# Patient Record
Sex: MALE | Race: BLACK OR AFRICAN AMERICAN | Employment: FULL TIME | URBAN - METROPOLITAN AREA
[De-identification: names, ages, dates, MRNs, and addresses within clinical notes are randomized per-mention and may not be internally consistent; named-entity substitution may affect disease eponyms.]

---

## 2020-04-27 ENCOUNTER — APPOINTMENT (OUTPATIENT)
Dept: GENERAL RADIOLOGY | Facility: HOSPITAL | Age: 35
DRG: 637 | End: 2020-04-27
Attending: PHYSICIAN ASSISTANT
Payer: COMMERCIAL

## 2020-04-27 ENCOUNTER — HOSPITAL ENCOUNTER (INPATIENT)
Facility: HOSPITAL | Age: 35
LOS: 3 days | Discharge: HOME | DRG: 637 | End: 2020-04-30
Attending: EMERGENCY MEDICINE | Admitting: HOSPITALIST
Payer: COMMERCIAL

## 2020-04-27 DIAGNOSIS — E87.2 METABOLIC ACIDOSIS DUE TO DIABETES MELLITUS (HCC): ICD-10-CM

## 2020-04-27 DIAGNOSIS — E11.9 DIABETES MELLITUS, NEW ONSET (HCC): Chronic | ICD-10-CM

## 2020-04-27 DIAGNOSIS — E13.10 DIABETIC KETOACIDOSIS WITHOUT COMA ASSOCIATED WITH OTHER SPECIFIED DIABETES MELLITUS (HCC): Primary | ICD-10-CM

## 2020-04-27 DIAGNOSIS — E11.69 METABOLIC ACIDOSIS DUE TO DIABETES MELLITUS (HCC): ICD-10-CM

## 2020-04-27 DIAGNOSIS — E86.0 DEHYDRATION: ICD-10-CM

## 2020-04-27 PROBLEM — E11.10 METABOLIC ACIDOSIS DUE TO DIABETES MELLITUS (HCC): Status: ACTIVE | Noted: 2020-04-27

## 2020-04-27 PROBLEM — E11.10 DIABETIC ACIDOSIS WITHOUT COMA (HCC): Status: ACTIVE | Noted: 2020-04-27

## 2020-04-27 PROCEDURE — 71045 X-RAY EXAM CHEST 1 VIEW: CPT | Performed by: PHYSICIAN ASSISTANT

## 2020-04-27 PROCEDURE — 99291 CRITICAL CARE FIRST HOUR: CPT | Performed by: HOSPITALIST

## 2020-04-27 RX ORDER — DEXMEDETOMIDINE HYDROCHLORIDE 4 UG/ML
INJECTION, SOLUTION INTRAVENOUS CONTINUOUS
Status: DISCONTINUED | OUTPATIENT
Start: 2020-04-27 | End: 2020-04-29

## 2020-04-27 RX ORDER — INSULIN ASPART 100 [IU]/ML
0.2 INJECTION, SOLUTION INTRAVENOUS; SUBCUTANEOUS ONCE
Status: COMPLETED | OUTPATIENT
Start: 2020-04-27 | End: 2020-04-27

## 2020-04-27 RX ORDER — DEXTROSE AND SODIUM CHLORIDE 5; .45 G/100ML; G/100ML
100 INJECTION, SOLUTION INTRAVENOUS CONTINUOUS PRN
Status: DISCONTINUED | OUTPATIENT
Start: 2020-04-27 | End: 2020-04-29

## 2020-04-27 RX ORDER — DEXTROSE MONOHYDRATE 25 G/50ML
50 INJECTION, SOLUTION INTRAVENOUS
Status: DISCONTINUED | OUTPATIENT
Start: 2020-04-27 | End: 2020-04-29 | Stop reason: SDUPTHER

## 2020-04-27 RX ORDER — SODIUM CHLORIDE 9 MG/ML
INJECTION, SOLUTION INTRAVENOUS CONTINUOUS
Status: DISCONTINUED | OUTPATIENT
Start: 2020-04-27 | End: 2020-04-30

## 2020-04-27 RX ORDER — ACETAMINOPHEN 500 MG
1000 TABLET ORAL EVERY 6 HOURS PRN
Status: DISCONTINUED | OUTPATIENT
Start: 2020-04-27 | End: 2020-04-30

## 2020-04-27 RX ORDER — ONDANSETRON 2 MG/ML
4 INJECTION INTRAMUSCULAR; INTRAVENOUS EVERY 6 HOURS PRN
Status: DISCONTINUED | OUTPATIENT
Start: 2020-04-27 | End: 2020-04-30

## 2020-04-27 NOTE — ED INITIAL ASSESSMENT (HPI)
Treated 10 days ago for strep. Finished Abx. For two days SOB. Denies fever. May have a coworker with [employer]. Brought in by EMS, sats 100. Lung sounds clear.

## 2020-04-27 NOTE — H&P
[facility]  History and Physical     Sabrina Jorge Patient Status:  Emergency    1985 MRN [id number]   Location [address] [facility] Attending Felipe Tyson MD   Hosp Day # 0 PCP None Pcp     Chief Complaint: Weakness    Hi tenderness or deformity. Abdomen: Soft, nontender, nondistended. Positive bowel sounds. No rebound or guarding. Neurologic: CNII-XII grossly intact. No focal neurological deficits. Musculoskeletal: Moves all extremities. Extremities: No edema.  No cya

## 2020-04-27 NOTE — ED PROVIDER NOTES
Patient Seen in: [facility] Emergency Department      History   Patient presents with:  Dyspnea CATHLEEN SOB    Stated Complaint: SOB    HPI    71-year-old anxious male here with complaint of cough in tandem with shortness of breath and dehydrated. Erika Chiang Right Ear: Tympanic membrane and external ear normal.      Left Ear: Tympanic membrane and external ear normal.      Nose: Rhinorrhea present. Rhinorrhea is clear. Mouth/Throat:      Lips: Pink.       Mouth: Mucous membranes are moist.      Pharynx: the following components:    Venous pH 7.01 (*)     Venous pCO2 17 (*)     Venous pO2 59 (*)     Venous O2 Sat.  Calc. 70 (*)     Venous Bicarbonate 4.3 (*)     All other components within normal limits   URINALYSIS WITH CULTURE REFLEX - Abnormal; Notable f heart size and pulmonary vascularity. No focal pulmonary opacity. CONCLUSION:  Shallow inspiration. No evidence of active cardiopulmonary disease.     Dictated by: Ginette Syed MD on 4/27/2020 at 3:39 PM     Finalized by: Ginette Syed MD on 4/27/2020 a

## 2020-04-28 PROCEDURE — 99233 SBSQ HOSP IP/OBS HIGH 50: CPT | Performed by: INTERNAL MEDICINE

## 2020-04-28 RX ORDER — AZITHROMYCIN 250 MG/1
250 TABLET, FILM COATED ORAL DAILY
Status: ON HOLD | COMMUNITY
End: 2020-04-30

## 2020-04-28 RX ORDER — POTASSIUM CHLORIDE 20 MEQ/1
40 TABLET, EXTENDED RELEASE ORAL ONCE
Status: COMPLETED | OUTPATIENT
Start: 2020-04-28 | End: 2020-04-28

## 2020-04-28 RX ORDER — SODIUM CHLORIDE 0.9 % (FLUSH) 0.9 %
10 SYRINGE (ML) INJECTION EVERY 12 HOURS
Status: DISCONTINUED | OUTPATIENT
Start: 2020-04-28 | End: 2020-04-30

## 2020-04-28 NOTE — PHYSICAL THERAPY NOTE
Physical therapy evaluation requested. Attempted to see pt at this time, but will hold until labs are therapeutic for activity.

## 2020-04-28 NOTE — PLAN OF CARE
Admitted at 2030 with DKA( A total of Novolog 54 units was given in ER and Insulin drip was started). Patient is anxious, tachypneac at rest with kussmaul breathing initially. Dr. Amber Sierra and Dr. Lisy Castellanos both were called for initial orders.  Attempted to star

## 2020-04-28 NOTE — PROGRESS NOTES
Pulmonary Progress Note        NAME: Solitario Thomas - ROOM: 6422/7181-Q - MRN: [id number] - Age: 36year old - : 1985        Last 24hrs: No events overnight, remains lethargic this AM but is able to have a conversation, knows where he is and what wa and renal function  4. Obesity - at risk for CARLOS ENRIQUE  -consider outpatient PSG  5. Nutrition - diet per endo  6. Proph - SCD  7.  Dispo - full code; would monitor in ICU for now  [facility] Pulmonary and Critical Care

## 2020-04-28 NOTE — RESPIRATORY THERAPY NOTE
Patient on vapotherm, weaned down to 25L 21%. Sats 100%, RR upper 20s to 30s. Will continue to wean as tolerated.

## 2020-04-28 NOTE — RESPIRATORY THERAPY NOTE
Patient not tolerating Bipap machine. Patient screams and pulls off mask and becomes more tachypneic. Patient started on vapotherm and tolerating well. Dr. Madelyn Washburn paged and notified.  No further orders at this time

## 2020-04-28 NOTE — CONSULTS
[facility] PULMONARY/CRITICAL CARE CONSULTATION       HPI: Arabella Thomson is a 36year old male with a history of morbid obesity who presented to the ER today with c/o generalized fatigue and dyspnea over the past few days.  He also has had increased urinary frequ Years of education: Not on file      Highest education level: Not on file    Occupational History      Not on file    Social Needs      Financial resource strain: Not on file      Food insecurity:        Worry: Not on file        Inability: Not on file Lungs - equal breath sounds bilaterally. No wheezing, crackles, rhonchi  CV - tachycardic, no murmurs  Abdomen - soft, nontender to palpation. No organomegaly appreciated. Obese  Extremities - No cyanosis, clubbing, edema appreciated.       Recent Labs   La

## 2020-04-28 NOTE — VASCULAR ACCESS
Consulted to place a midline. Right arm evaluated, unable to visualize basilic or brachial vessels on ultrasound. Cephalic vessel noted. Midline placed with difficulty threading line.  Additional guidewire utilized and catheter placed with good blood return

## 2020-04-28 NOTE — PROGRESS NOTES
Dr. Marycarmen Pabon ([facility]) called and patient status update was given. Also notified that morning labs were not drawn due to pt is a difficult stick ( x 3 un succesful attempts). To page her back once morning labs were drawn and resulted.

## 2020-04-28 NOTE — PROGRESS NOTES
[facility]  Progress Note    Peyman Marie Patient Status:  Inpatient    1985 MRN [id number]   [facility] 6NE-A Attending Angela Burger MD   Hosp Day # 1 PCP None Pcp     CC: Generalized weakness    SUBJECTIVE:  Generalized weak 21 04/28/2020     04/28/2020    K 4.1 04/28/2020     04/28/2020    CO2 8.0 04/28/2020     04/28/2020    CA 8.6 04/28/2020    ALB 4.3 04/27/2020    ALKPHO 98 04/27/2020    BILT 0.7 04/27/2020    TP 9.6 04/27/2020    AST 20 04/27/2020    A consult  3.   2. Hyponatremia  1. IV fluids  2. Sodium improving  3. Follow BMP in a.m.  3. Acute kidney injury  1. IV fluids  2. Follow BMP in a.m.  4. Reactive leukocytosis, sinus tachycardia  1. Rapid SARS-CoV-2 negative on 4/27/2020  2.  Streptococcal g

## 2020-04-28 NOTE — PAYOR COMM NOTE
--------------  ADMISSION REVIEW     Payor: [address] [facility]  Subscriber #:  [id number]  Authorization Number: [id number]    Admit date: 4/27/20  Admit time: 2021       Patient Seen in: [facility] Emergency Department    History   Patient presents Tachypnea present. Comments: Labored breathing  Skin:     General: Skin is warm. Capillary Refill: Capillary refill takes less than 2 seconds. Neurological:      General: No focal deficit present.       Mental Status: He is alert and oriented to Neutrophil Absolute Prelim 13.92 (*)     Neutrophil Absolute 13.92 (*)     Monocyte Absolute 1.10 (*)      Disposition and Plan     Clinical Impression:  Diabetic ketoacidosis without coma associated with other specified diabetes mellitus ([facility]  (primar rebound or guarding. Neurologic: CNII-XII grossly intact. No focal neurological deficits. Musculoskeletal: Moves all extremities. Extremities: No edema. No cyanosis. Integument: No rashes or lesions. Psychiatric: Appropriate mood and affect.     Lab Normocephalic, without obvious abnormality, atraumatic  Throat: oral mucosa moist  Neck: no adenopathy, no carotid bruit, no JVD  Lungs: clear to auscultation bilaterally, tachypneic  Heart: S1, S2 normal, no murmur,  regular rate and rhythm  Abdomen: soft 4/28/2020 0200 Rate/Dose Verify 100 mL/hr Intravenous     4/28/2020 0112 New Bag 100 mL/hr Intravenous       insulin aspart (NOVOLOG) 100 UNIT/ML vial 27 Units     Date Action Dose Route     4/27/2020 1623 Given 27 Units Subcutaneous (Right Upper Arm) Route     4/28/2020 0200 Given 40 mEq Oral       Potassium Chloride ER (K-DUR M20) CR tab 40 mEq     Date Action Dose Route     4/28/2020 1304 Given 40 mEq Oral       0.9% NaCl infusion     Date Action Dose Route     4/27/2020 2100 New Bag (none) Intraveno

## 2020-04-28 NOTE — CONSULTS
[facility]  Report of Consultation    Agathaevelyne Willis Patient Status:  Inpatient    1985 MRN [id number]   [facility] 6NE-A Attending Devon Tanner MD   Hosp Day # 1 PCP None Pcp     Reason for Consultation:  DKA    History of Pres Regular Human (NOVOLIN R) 100 Units in sodium chloride 0.9% 100 mL infusion, 2-52 Units/hr, Intravenous, Continuous  •  glucose (DEX4) oral liquid 15 g, 15 g, Oral, Q15 Min PRN **OR** Glucose-Vitamin C (DEX-4) chewable tab 4 tablet, 4 tablet, Oral, Q15 Min acidosis without coma (HCC)     Hyponatremia     SHANNAN (acute kidney injury) ([facility]     Metabolic acidosis     Morbid obesity with BMI of 40.0-44.9, adult ([facility]     Diabetic ketoacidosis without coma associated with other specified diabetes mellitus ([facility]

## 2020-04-28 NOTE — PROGRESS NOTES
[facility]  Progress Note     Haig Patches Patient Status:  Inpatient    1985 MRN [id number]   [facility] 6NE-A Attending Stefania Vigil MD   Hosp Day # 1 PCP None Pcp     Chief Complaint: dka    S: Patient feeling better tod mg/dL). No results for input(s): PTP, INR in the last 168 hours. No results for input(s): TROP, CK in the last 168 hours. Imaging: Imaging data reviewed in Epic.     Medications:   • Normal Saline Flush  10 mL Intravenous Q12H       ASSESSMENT

## 2020-04-29 PROBLEM — E11.9 DIABETES MELLITUS, NEW ONSET (HCC): Chronic | Status: ACTIVE | Noted: 2020-04-29

## 2020-04-29 PROCEDURE — 99233 SBSQ HOSP IP/OBS HIGH 50: CPT | Performed by: CLINICAL NURSE SPECIALIST

## 2020-04-29 PROCEDURE — 99232 SBSQ HOSP IP/OBS MODERATE 35: CPT | Performed by: HOSPITALIST

## 2020-04-29 RX ORDER — POTASSIUM CHLORIDE 1.5 G/1.77G
40 POWDER, FOR SOLUTION ORAL EVERY 4 HOURS
Status: DISCONTINUED | OUTPATIENT
Start: 2020-04-29 | End: 2020-04-29

## 2020-04-29 RX ORDER — DEXTROSE MONOHYDRATE 25 G/50ML
50 INJECTION, SOLUTION INTRAVENOUS
Status: DISCONTINUED | OUTPATIENT
Start: 2020-04-29 | End: 2020-04-30

## 2020-04-29 RX ORDER — ENOXAPARIN SODIUM 100 MG/ML
60 INJECTION SUBCUTANEOUS DAILY
Status: DISCONTINUED | OUTPATIENT
Start: 2020-04-30 | End: 2020-04-30

## 2020-04-29 RX ORDER — BLOOD-GLUCOSE METER
EACH MISCELLANEOUS
Qty: 1 KIT | Refills: 0 | Status: SHIPPED | OUTPATIENT
Start: 2020-04-29

## 2020-04-29 RX ORDER — ENOXAPARIN SODIUM 100 MG/ML
40 INJECTION SUBCUTANEOUS DAILY
Status: DISCONTINUED | OUTPATIENT
Start: 2020-04-29 | End: 2020-04-29

## 2020-04-29 RX ORDER — POTASSIUM CHLORIDE 14.9 MG/ML
20 INJECTION INTRAVENOUS ONCE
Status: COMPLETED | OUTPATIENT
Start: 2020-04-30 | End: 2020-04-30

## 2020-04-29 RX ORDER — POTASSIUM CHLORIDE 20 MEQ/1
40 TABLET, EXTENDED RELEASE ORAL EVERY 4 HOURS
Status: COMPLETED | OUTPATIENT
Start: 2020-04-29 | End: 2020-04-29

## 2020-04-29 RX ORDER — BLOOD SUGAR DIAGNOSTIC
STRIP MISCELLANEOUS
Qty: 100 STRIP | Refills: 2 | Status: SHIPPED | OUTPATIENT
Start: 2020-04-29

## 2020-04-29 RX ORDER — POTASSIUM CHLORIDE 20 MEQ/1
40 TABLET, EXTENDED RELEASE ORAL EVERY 4 HOURS
Status: DISCONTINUED | OUTPATIENT
Start: 2020-04-29 | End: 2020-04-29

## 2020-04-29 NOTE — PLAN OF CARE
Patient is alert and oriented x 4. Patient verbalizes fatigue and SOB when walked to the BR with RN. Patient updated on plan of care. Denies pain and discomfort. Baseline tachycardia. Denies questions or concerns. Verbalized understanding of education.  Nazario

## 2020-04-29 NOTE — PROGRESS NOTES
Assumed care for this patient at 0730. Patient drowsy but oriented x4. No complaints of pain. Sinus Tach per monitor. BP stable, 1 episode of SBP in upper 80s while getting to chair, patient denies any dizziness.  levemir given at 10am. Insulin gtt d/c'd at

## 2020-04-29 NOTE — PLAN OF CARE
Assumed care at [address]. Continue to be lethargic/oriented x 4. Remain on Insulin drip and IVF. BMP results relayed to Dr. Hamida Augustine), to check labs q 8 now. Tele:ST; VSS. Afebrile. Initial dose of Zosyn/Iv given.      No acute change in condition overnight

## 2020-04-29 NOTE — PAYOR COMM NOTE
--------------  CONTINUED STAY REVIEW    Payor: [address] [facility]  Subscriber #:  [id number]  Authorization Number: [id number]  -FAXING CLINICAL UPDATE FOR 4/29/20    Admit date: 4/27/20  Admit time: 2021 4/29/20   Chief Complaint: dka  S: Patient f bmi 41  7. Leukocytosis; Possible occult infection? Natacha Lin dermargination from dka instead; cultures negative thus far. On zosyn. Can probably stop today and monitor cbc     Plan of care: bedside teaching . Conversion to subQ insulin per endo.   Hopefully Intravenous       Normal Saline Flush 0.9 % injection 10 mL     Date Action Dose Route     4/28/2020 2000 Given 10 mL Intravenous       Piperacillin Sod-Tazobactam So (ZOSYN) 4.5 g in dextrose 5 % 100 mL MBP/ADD-vantage     Date Action Dose Route     4/29/

## 2020-04-29 NOTE — OCCUPATIONAL THERAPY NOTE
OCCUPATIONAL THERAPY QUICK EVALUATION - INPATIENT    Room Number: 2368/0995-W  Evaluation Date: 4/29/2020     Type of Evaluation: Initial  Presenting Problem: diabetic ketoacidosis    Physician Order: IP Consult to Occupational Therapy  Reason for Therapy: Inpatient Daily Activity Short Form   How much help from another person does the patient currently need…  -   Putting on and taking off regular lower body clothing?: A Little   -   Bathing (including washing, rinsing, drying)?: A Little  -   Toileting, whi Complexity  Occupational Profile/Medical History  LOW - Brief history including review of medical or therapy records    Specific performance deficits impacting engagement in ADL/IADL  LOW  1 - 3 performance deficits    Client Assessment/Performance Deficit

## 2020-04-29 NOTE — PHYSICAL THERAPY NOTE
PHYSICAL THERAPY EVALUATION - INPATIENT     Room Number: 9315/1449-E  Evaluation Date: 4/29/2020  Type of Evaluation: Initial  Physician Order: PT Eval and Treat    Presenting Problem: Diabetic ketoacidosis  Reason for Therapy: Mobility Dysfunction and D 5/5    BALANCE  Static Sitting: Fair  Dynamic Sitting: Fair  Static Standing: Poor +  Dynamic Standing: Poor +    ACTIVITY TOLERANCE  Heart Rate: at rest 112 bpm and with activity 130 bpm      O2 WALK  SPO2 on Room Air at Rest: 100  SPO2 Ambulation on Room session/findings; All patient questions and concerns addressed    ASSESSMENT   Patient is a 36year old male admitted on 4/27/2020 for diabetic ketoacidosis. Pertinent comorbidities and personal factors impacting therapy include morbid obesity.   In this PT 4/29/2020

## 2020-04-29 NOTE — PROGRESS NOTES
Pulmonary Progress Note        NAME: Iván Moore - ROOM: 1809-E - MRN: [id number] - Age: 36year old - : 1985    Past Medical History:   Diagnosis Date   • Morbid obesity ([facility]          SUBJECTIVE: No new events overnight, feels better     O GFRAA 91 110 100   GFRNAA 79 95 87   CA 8.6 7.9* 8.6   * 136 138   K 3.8 4.3 3.5    114* 111   CO2 13.0* 10.0* 16.0*       ASSESSMENT/PLAN:    1. Acute respiratory failure - secondary to DKA with severe acidosis. Resolved   2.  DKA - better

## 2020-04-29 NOTE — PROGRESS NOTES
[facility]  Progress Note    Rosetta Allred Patient Status:  Inpatient    1985 MRN [id number]   [facility] 6NE-A Attending Elvis Pickett MD   Hosp Day # 2 PCP None Pcp       SUBJECTIVE:  Overnight improvement in bicarb noted, st Oral, Q15 Min PRN    Or  dextrose 50 % injection 50 mL, 50 mL, Intravenous, Q15 Min PRN    Or  glucose (DEX4) oral liquid 30 g, 30 g, Oral, Q15 Min PRN    Or  Glucose-Vitamin C (DEX-4) chewable tab 8 tablet, 8 tablet, Oral, Q15 Min PRN  insulin detemir (LE the hospital.  Please feel free to contact me with any questions or concerns.       Warden Ryan MD  Endocrinology, Diabetes, and Metabolism  [facility]  4/29/2020  1:31 PM

## 2020-04-29 NOTE — DIETARY NOTE
[facility]   CLINICAL NUTRITION    Torin Sean     Admitting diagnosis:  Dehydration [E86.0]  Diabetic ketoacidosis without coma associated with other specified diabetes mellitus ([facility] [B53.21]  Metabolic acidosis due to diabetes mellitus ([facility] [E1

## 2020-04-29 NOTE — CONSULTS
[facility]  Diabetes Consult Note    Havenwestley Cheneylaney Patient Status:  Inpatient    1985 MRN [id number]   [facility] 2NE-A Attending Nick Noguera MD   Hosp Day # 2 PCP None Pcp     Reason for Consult:     Recommendations for new 36year old male with new onset diabetes admitted 4/27/2020 for generalized fatigue, dyspnea.       Assessment/Recommendations:    Patient reports he was experiencing generalized fatigue and dyspnea, but also increased urinary frequency, thirst and vomiting and he stated he would. Discussed sick day management and provided a handout covering this topic as well as Covid prevention/safety. Discussed options for glucose monitoring - glucometer and CGM.   Taught the One Touch Verio Flex and he was able to te Russel Shirley Lantus, Broderick Courier  · Supplies:  BD pen needles (Layla); BD syringes  · Glucometer: One Touch Ultra Flex    PROVIDER F/U RECOMMENDATIONS:    · PCP  · Endocrinologist    A total of 50 minutes were spent with the patient, 100% was spent

## 2020-04-30 VITALS
BODY MASS INDEX: 42.51 KG/M2 | TEMPERATURE: 99 F | DIASTOLIC BLOOD PRESSURE: 59 MMHG | HEIGHT: 70 IN | RESPIRATION RATE: 18 BRPM | WEIGHT: 296.94 LBS | OXYGEN SATURATION: 100 % | SYSTOLIC BLOOD PRESSURE: 103 MMHG | HEART RATE: 116 BPM

## 2020-04-30 PROCEDURE — 99233 SBSQ HOSP IP/OBS HIGH 50: CPT | Performed by: CLINICAL NURSE SPECIALIST

## 2020-04-30 PROCEDURE — 99239 HOSP IP/OBS DSCHRG MGMT >30: CPT | Performed by: HOSPITALIST

## 2020-04-30 RX ORDER — POTASSIUM CHLORIDE 20 MEQ/1
40 TABLET, EXTENDED RELEASE ORAL EVERY 4 HOURS
Status: DISCONTINUED | OUTPATIENT
Start: 2020-04-30 | End: 2020-04-30

## 2020-04-30 RX ORDER — ATORVASTATIN CALCIUM 20 MG/1
20 TABLET, FILM COATED ORAL NIGHTLY
Qty: 30 TABLET | Refills: 2 | Status: SHIPPED | OUTPATIENT
Start: 2020-04-30 | End: 2020-12-18 | Stop reason: ALTCHOICE

## 2020-04-30 RX ORDER — POTASSIUM CHLORIDE 20 MEQ/1
40 TABLET, EXTENDED RELEASE ORAL ONCE
Qty: 2 TABLET | Refills: 0 | Status: SHIPPED | OUTPATIENT
Start: 2020-04-30 | End: 2020-04-30

## 2020-04-30 NOTE — PROGRESS NOTES
[facility]  Diabetes Follow Up      Richard Thomas  [id number]       Patient seen and evaluated; alert and stated he is feeling much better.       Recent Labs   Lab 04/29/20  1118 04/29/20  1625 04/29/20  2109 04/30/20  0741 04/30/20  1141   PG reviewed discharge plans, contacts with phone numbers, insulin dosing, etc. as he had not seen his AVS at this time. Reviewed the pathophysiology of type 2 diabetes versus NICKIE and explained tests are still pending confirmation of this diagnosis.   Exp for new onset diabetes self-management including nutrition, blood glucose monitoring, insulin administration, medications, treatment options, follow-up coordination and resources.     JANICE Hurt  4/30/2020  4:37 PM

## 2020-04-30 NOTE — DIETARY NOTE
[facility]   CLINICAL NUTRITION    Torin Sean     Admitting diagnosis:  Dehydration [E86.0]  Diabetic ketoacidosis without coma associated with other specified diabetes mellitus ([facility] [U05.17]  Metabolic acidosis due to diabetes mellitus ([facility] [E1 will continue to reinforce DM ed prior to d/c. Encouraged pt to continue watching videos, read handout. Pt reports he will do so later. Patient is at low nutrition risk at this time.     Please consult if patient status changes or nutrition issues melva

## 2020-04-30 NOTE — PROGRESS NOTES
Patient seen and examined. Medically cleared for discharge, if all involved specialists' consent for discharge.     Yana Coughlin MD  [facility]  Internal Medicine Hospitalist  Cell [phone]

## 2020-04-30 NOTE — PROGRESS NOTES
[facility]  Progress Note    Gissel Patterson Patient Status:  Inpatient    1985 MRN [id number]   [facility] 2NE-A Attending Juliette Paulson MD   Hosp Day # 3 PCP None Pcp       SUBJECTIVE:  No acute events overnight.   No significan 154* 150* 160* 195* 159* 179* 163* 243* 141* 196* 201* 235*       Meds:   glucose (DEX4) oral liquid 15 g, 15 g, Oral, Q15 Min PRN    Or  Glucose-Vitamin C (DEX-4) chewable tab 4 tablet, 4 tablet, Oral, Q15 Min PRN    Or  dextrose 50 % injection 50 mL, 50

## 2020-04-30 NOTE — DISCHARGE SUMMARY
[facility]  DISCHARGE SUMMARY     [facility] Patient Status:  Inpatient    1985 MRN [id number]   [facility] 2NE-A Attending Sabine Wilson MD   Hosp Day # 3 PCP None Pcp     Date of Admission: 2020  Date of Discharge: Medication List:     Discharge Medications      START taking these medications      Instructions Prescription details   atorvastatin 20 MG Tabs  Commonly known as:  LIPITOR      Take 1 tablet (20 mg total) by mouth nightly.    Quantity:  30 tablet  Refills: MM Misc     Please  your prescriptions at the location directed by your doctor or nurse    Bring a paper prescription for each of these medications  · atorvastatin 20 MG Tabs  · OneTouch Delica Lancets Fine Misc  · OneTouch Verio Flex System w/Devic

## 2020-04-30 NOTE — CM/SW NOTE
Received call from patient's mother, concerned about d/c home. Requesting home health setup thru [facility], patient will be staying with parents: Maura Guillen. Kimberly, [address] [place] [address].  Explained home health is for patient's that have taxing effort to leave the h

## 2020-04-30 NOTE — PLAN OF CARE
Pt is alert x 4, on Ra, NSR/ST on the monitor. PT denies any cardiovascular symptoms at this time. Pt is up ad serge, continent of B/B. All needs met and will continue to monitor. PLAN: Discharge pt.        POC: Discussed and updated with pt, pt verbal appropriate and evaluate response  - Consider cultural and social influences on pain and pain management  - Manage/alleviate anxiety  - Utilize distraction and/or relaxation techniques  - Monitor for opioid side effects  - Notify MD/LIP if interventions un blood glucose and labs as ordered  - Change IV dextrose concentration and/or IV rate(s) as ordered  - Administer insulin drip as ordered  - Administer hydrocortisone as ordered  Outcome: Progressing

## 2020-04-30 NOTE — PLAN OF CARE
Assumed pt care at [address]. Drowsy/lethargic & Ox4. RA, denies pain/SOB, lung sounds clear/diminished, VSS, Sinus tachy in 110s on tele. Voids in urinal/continent. Up with SBA. Midline to R arm, Arm precautions in place. No blood return.  POC monitor labs/suga with activity and pre-medicate as appropriate  Outcome: Progressing     Problem: RISK FOR INFECTION - ADULT  Goal: Absence of fever/infection during anticipated neutropenic period  Description  INTERVENTIONS  - Monitor WBC  - Administer growth factors as o hypoglycemia  Description  Interventions:  - Assess for risk factors of hypoglycemia  - Monitor for signs and symptoms of hypoglycemia  - Monitor blood glucose as ordered and per policy  - Administer IV glucose as ordered  - Change IV dextrose concentratio

## 2020-04-30 NOTE — PROGRESS NOTES
[facility]  Progress Note     Shravan Ureña Patient Status:  Inpatient    1985 MRN [id number]   [facility] 6NE-A Attending Myra Jo MD   Hosp Day # 3 PCP None Pcp     Chief Complaint: dka    S: Patient feeling fine today --   --   --   --   --   --    AST  --  20  --   --   --   --   --   --   --   --    ALT 55  --   --   --   --   --   --   --   --   --    BILT 0.7  --   --   --   --   --   --   --   --   --    TP 9.6*  --   --   --   --   --   --   --   --   --     < >

## 2020-05-01 ENCOUNTER — TELEPHONE (OUTPATIENT)
Dept: ENDOCRINOLOGY CLINIC | Facility: CLINIC | Age: 35
End: 2020-05-01

## 2020-05-01 ENCOUNTER — PATIENT OUTREACH (OUTPATIENT)
Dept: CASE MANAGEMENT | Age: 35
End: 2020-05-01

## 2020-05-01 DIAGNOSIS — N17.9 AKI (ACUTE KIDNEY INJURY) (HCC): ICD-10-CM

## 2020-05-01 DIAGNOSIS — Z02.9 ENCOUNTERS FOR UNSPECIFIED ADMINISTRATIVE PURPOSE: ICD-10-CM

## 2020-05-01 DIAGNOSIS — E87.1 HYPONATREMIA: ICD-10-CM

## 2020-05-01 DIAGNOSIS — E11.69 METABOLIC ACIDOSIS DUE TO DIABETES MELLITUS (HCC): ICD-10-CM

## 2020-05-01 DIAGNOSIS — E86.0 DEHYDRATION: ICD-10-CM

## 2020-05-01 DIAGNOSIS — E11.9 DIABETES MELLITUS, NEW ONSET (HCC): Chronic | ICD-10-CM

## 2020-05-01 DIAGNOSIS — E11.10 DIABETIC KETOACIDOSIS WITHOUT COMA ASSOCIATED WITH TYPE 2 DIABETES MELLITUS (HCC): ICD-10-CM

## 2020-05-01 DIAGNOSIS — E87.2 METABOLIC ACIDOSIS DUE TO DIABETES MELLITUS (HCC): ICD-10-CM

## 2020-05-01 PROCEDURE — 1111F DSCHRG MED/CURRENT MED MERGE: CPT

## 2020-05-01 NOTE — PROGRESS NOTES
NCM attempted to contact the patient for hospital follow up. Unable to leave a message due to the mailbox being full. ARDEN will try again later.

## 2020-05-01 NOTE — PAYOR COMM NOTE
Payor: [address] [facility]  Subscriber #:  [id number]  Authorization Number: [id number]    Admit date: 4/27/20  Admit time:  2021  Discharge Date: 4/30/2020    -ALL CLINICALS HAVE BEEN FAXED- PLEASE FAX APPROVAL FOR DAYS. THANK YOU.

## 2020-05-01 NOTE — TELEPHONE ENCOUNTER
Diabetes education: attempted to call and schedule video visit (will need to get him on [employer] first) but pt's voicemail was full. Will try again later today or Monday.

## 2020-05-04 NOTE — PROGRESS NOTES
Initial Post Discharge Follow Up   Discharge Date: 4/30/20  Contact Date: 5/1/2020    Consent Verification:  Assessment Completed With: Patient  HIPAA Verified?   Yes    Discharge Dx:    Acute hypoxic respiratory failure secondary to DKA and severe acido hospital was your diagnoses explained to you? Yes  • Do you have any questions about your diagnoses?  No  • Are you able to perform normal daily activities of living as you have prior to your hospital stay (dressing, bathing, ambulating to the bathroom, etc prescribed? No  Are you having any concerns with constipation? No    Referrals/orders at D/C:  Home Health/Services ordered at D/C? No     DME ordered at D/C? No    Needs post D/C:   Now that you are home, are there any needs or concerns you need addressed 5/14/2020    [x]  Discharge Summary, Discharge medications reviewed/discussed/and reconciled against outpatient medications with patient,  and orders reviewed and discussed. Any changes or updates to medications and or orders sent to PCP.      For patients

## 2020-05-04 NOTE — TELEPHONE ENCOUNTER
LMTCB and sched DB educ via video on [employer] or "[employer] mary or via computer if microphone & camera enabled.

## 2020-05-05 ENCOUNTER — TELEMEDICINE (OUTPATIENT)
Dept: INTERNAL MEDICINE CLINIC | Facility: CLINIC | Age: 35
End: 2020-05-05

## 2020-05-05 DIAGNOSIS — E13.10 DIABETIC KETOACIDOSIS WITHOUT COMA ASSOCIATED WITH OTHER SPECIFIED DIABETES MELLITUS (HCC): Primary | ICD-10-CM

## 2020-05-05 DIAGNOSIS — E66.01 MORBID OBESITY WITH BMI OF 40.0-44.9, ADULT (HCC): ICD-10-CM

## 2020-05-05 DIAGNOSIS — E11.9 DIABETES MELLITUS, NEW ONSET (HCC): Chronic | ICD-10-CM

## 2020-05-05 PROCEDURE — 99495 TRANSJ CARE MGMT MOD F2F 14D: CPT | Performed by: CLINICAL NURSE SPECIALIST

## 2020-05-05 NOTE — PROGRESS NOTES
Video Visit  [address]      Please note that the following visit was completed using two-way, real-time interactive audio and/or video communication.   This has been done in [place] to provide continuity of care in the be contact within 2 business days post discharge first initiated on Date: 5/1/2020      Allergies:  No Known Allergies   Current Meds:  atorvastatin 20 MG Oral Tab, Take 1 tablet (20 mg total) by mouth nightly., Disp: 30 tablet, Rfl: 2  Insulin Aspart Pen 100 CREATSERUM 0.98 04/30/2020 06:03 AM    CA 8.7 04/30/2020 06:03 AM    MG 2.6 04/30/2020 04:32 PM    ALB 3.3 (L) 04/30/2020 04:32 PM    ALT 55 04/27/2020 03:20 PM    AST 20 04/27/2020 04:09 PM    A1C 11.6 (H) 04/27/2020 03:20 PM         There is no immunizat this Visit:  No outpatient medications have been marked as taking for the 5/5/20 encounter (Appointment) with JANICE Gay.     Requested Prescriptions      No prescriptions requested or ordered in this encounter         Health Maintenance:  LDL moderate    Patient seen within 7 days from date of discharge.     Discharge Disposition/Plan:   Your appointments     Date & Time Appointment Department [facility])    May 07, 2020  1:00 PM CDT Video Visit with Jourdan Casas RD,LDN,[facility] joining the video  4. Come off Wi-Fi and switch over to Data    If you have additional issues, please see our Video Visit Tip Sheet. If you believe this is an emergency, please dial 911 immediately.           May 20, 2020  4:20 PM CDT Follow Up wit

## 2020-05-05 NOTE — PROGRESS NOTES
TRANSITIONAL CARE CLINIC PHARMACIST MEDICATION RECONCILIATION        Arabella Thomson MRN [id number]    1985 PCP None Pcp       Comments: Medication history completed by the [address] [place] [address] Pharmacist with the patient on the phone.       The bedtime. Denies any muscle pain or cramping. Educated the patient to call the physician if any signs of muscle pain or cramping occurs. Patient reports testing his blood sugar 4 to 5 times a day.   Patient states his blood sugar this morning was 161mg/

## 2020-05-07 ENCOUNTER — TELEMEDICINE (OUTPATIENT)
Dept: ENDOCRINOLOGY CLINIC | Facility: CLINIC | Age: 35
End: 2020-05-07

## 2020-05-07 DIAGNOSIS — E11.65 TYPE 2 DIABETES MELLITUS WITH HYPERGLYCEMIA, UNSPECIFIED WHETHER LONG TERM INSULIN USE (HCC): Primary | ICD-10-CM

## 2020-05-07 PROCEDURE — G0108 DIAB MANAGE TRN  PER INDIV: HCPCS | Performed by: DIETITIAN, REGISTERED

## 2020-05-07 NOTE — PROGRESS NOTES
Torin Estrada   9/21/1985 attended Step 1 Diabetic Education:     5/7/2020  Referring Provider: Wood Mckenzie Start time: 1:00 End time: 2:00    Due to COVID-19 ACTION PLAN, the patient's office visit was conducted via video.      The patient verbally co schedules and target goals:   Fasting / Pre-meal  2 Hour Post-prandial 140-180  Demonstrated ability to perform blood glucose testing on:  One Touch Verio Flex  FBS's: 118, 137  Pre-L 111, 103  Pre-D 56, 60 (see above-pt hasn't been active, advised hi

## 2020-05-13 ENCOUNTER — TELEMEDICINE (OUTPATIENT)
Dept: INTERNAL MEDICINE CLINIC | Facility: CLINIC | Age: 35
End: 2020-05-13
Payer: COMMERCIAL

## 2020-05-13 DIAGNOSIS — E11.9 DIABETES MELLITUS, NEW ONSET (HCC): ICD-10-CM

## 2020-05-13 DIAGNOSIS — E66.01 MORBID OBESITY WITH BMI OF 40.0-44.9, ADULT (HCC): ICD-10-CM

## 2020-05-13 DIAGNOSIS — E13.10 DIABETIC KETOACIDOSIS WITHOUT COMA ASSOCIATED WITH OTHER SPECIFIED DIABETES MELLITUS (HCC): Primary | ICD-10-CM

## 2020-05-13 PROCEDURE — 99203 OFFICE O/P NEW LOW 30 MIN: CPT | Performed by: INTERNAL MEDICINE

## 2020-05-13 NOTE — PROGRESS NOTES
Torin Estrada  9/21/1985    No chief complaint on file. Cathy Rico is a 36year old male who presents to [facility] care. The patient was recently hospitalized for DKA and new-onset DM2.  Since hospital discharge he was evaluated an meals; signs/symptoms of hypoglycemia 1 kit 0   • OneTouch Delica Lancets Fine Does not apply Misc Test before meals; signs/symptoms of hypoglycemia 100 each 2      No Known Allergies   Past Medical History:   Diagnosis Date   • Diabetes ([facility] [address]    • Morbid or ordered in this encounter       Imaging & Consults:  None    No follow-ups on file. There are no Patient Instructions on file for this visit. All questions were answered and the patient agrees with the plan.      Thank you,  Dianna Bloch, MD

## 2020-05-29 ENCOUNTER — TELEMEDICINE (OUTPATIENT)
Dept: ENDOCRINOLOGY CLINIC | Facility: CLINIC | Age: 35
End: 2020-05-29

## 2020-05-29 DIAGNOSIS — E11.65 TYPE 2 DIABETES MELLITUS WITH HYPERGLYCEMIA, UNSPECIFIED WHETHER LONG TERM INSULIN USE (HCC): Primary | ICD-10-CM

## 2020-05-29 PROCEDURE — G0108 DIAB MANAGE TRN  PER INDIV: HCPCS | Performed by: DIETITIAN, REGISTERED

## 2020-05-29 NOTE — PROGRESS NOTES
Diabetes Education Follow-up Note    Referring Provider: Deloria Landau   Start time: 3:30 End time: 4:00    Due to COVID-19 ACTION PLAN, the patient's office visit was conducted via video.      The patient verbally consents to an audio-video consultation

## 2020-06-02 ENCOUNTER — TELEMEDICINE (OUTPATIENT)
Dept: INTERNAL MEDICINE CLINIC | Facility: CLINIC | Age: 35
End: 2020-06-02

## 2020-06-02 VITALS — HEIGHT: 70 IN | BODY MASS INDEX: 42.23 KG/M2 | WEIGHT: 295 LBS

## 2020-06-02 DIAGNOSIS — N17.9 AKI (ACUTE KIDNEY INJURY) (HCC): ICD-10-CM

## 2020-06-02 DIAGNOSIS — E11.9 DIABETES MELLITUS, NEW ONSET (HCC): ICD-10-CM

## 2020-06-02 DIAGNOSIS — Z51.81 THERAPEUTIC DRUG MONITORING: Primary | ICD-10-CM

## 2020-06-02 PROCEDURE — 99214 OFFICE O/P EST MOD 30 MIN: CPT | Performed by: NURSE PRACTITIONER

## 2020-06-02 RX ORDER — TOPIRAMATE 25 MG/1
25 TABLET ORAL 2 TIMES DAILY
Qty: 60 TABLET | Refills: 1 | Status: SHIPPED | OUTPATIENT
Start: 2020-06-02 | End: 2020-08-06

## 2020-06-02 NOTE — PROGRESS NOTES
[facility] WEIGHT MANAGEMENT VIRTUAL VIDEO ENCOUNTER     Torin García verbally consents to a Virtual/Telephone Check-In service on 06/02/20  Patient understands and accepts financial responsibility for any deductible, co-insurance and/or co-pays ass Soda:  Juice:  Coffee/Tea:weaned off coffee  Sparkling water     Portion: medium   Eats 3 meals per day: yes   Number of restaurant or fast food meals/week: 1 meals/week    Social hx and lifestyle reviewed:    Work: goes into office ( for apparent distress, speaking in full sentences comfortably   SKIN: warm, pink, dry without rashes to exposed area   EYES: conjunctiva pink  HEENT: atraumatic, normocephalic  LUNGS: normal work of breathing, non labored  CARDIO: normal work, no exertion  EXT signs/symptoms of hypoglycemia, Disp: 1 kit, Rfl: 0  OneTouch Delica Lancets Fine Does not apply Misc, Test before meals; signs/symptoms of hypoglycemia, Disp: 100 each, Rfl: 2    No current facility-administered medications on file prior to visit.        A symptoms or acute distress. Please note that the following visit was completed using two-way, real-time interactive audio and/or video communication.   This has been done in [place] to provide continuity of care in the best interest of the provider

## 2020-06-29 DIAGNOSIS — Z51.81 THERAPEUTIC DRUG MONITORING: ICD-10-CM

## 2020-06-30 ENCOUNTER — TELEPHONE (OUTPATIENT)
Dept: INTERNAL MEDICINE CLINIC | Facility: CLINIC | Age: 35
End: 2020-06-30

## 2020-06-30 DIAGNOSIS — Z51.81 THERAPEUTIC DRUG MONITORING: ICD-10-CM

## 2020-06-30 RX ORDER — TOPIRAMATE 25 MG/1
25 TABLET ORAL 2 TIMES DAILY
Qty: 60 TABLET | Refills: 1 | OUTPATIENT
Start: 2020-06-30 | End: 2020-07-30

## 2020-06-30 RX ORDER — TOPIRAMATE 25 MG/1
25 TABLET ORAL 2 TIMES DAILY
Qty: 180 TABLET | Refills: 0 | OUTPATIENT
Start: 2020-06-30

## 2020-06-30 NOTE — TELEPHONE ENCOUNTER
Requesting  Requested Prescriptions     Pending Prescriptions Disp Refills   • topiramate 25 MG Oral Tab 60 tablet 1     Sig: Take 1 tablet (25 mg total) by mouth 2 (two) times daily. Refer to discharge instructions for dosing.      LOV: 6/2/20  RTC: 4 week

## 2020-06-30 NOTE — TELEPHONE ENCOUNTER
90 day request to refill topiramate instead of 30 day that was approved. It is not required for insurance so I denied the 90 day request with note to fill as written.

## 2020-07-02 ENCOUNTER — OFFICE VISIT (OUTPATIENT)
Dept: INTERNAL MEDICINE CLINIC | Facility: CLINIC | Age: 35
End: 2020-07-02
Payer: COMMERCIAL

## 2020-07-02 VITALS
TEMPERATURE: 99 F | RESPIRATION RATE: 16 BRPM | WEIGHT: 303 LBS | HEIGHT: 70 IN | HEART RATE: 90 BPM | BODY MASS INDEX: 43.38 KG/M2 | SYSTOLIC BLOOD PRESSURE: 124 MMHG | DIASTOLIC BLOOD PRESSURE: 80 MMHG

## 2020-07-02 DIAGNOSIS — E11.9 DIABETES MELLITUS, NEW ONSET (HCC): ICD-10-CM

## 2020-07-02 DIAGNOSIS — R63.5 WEIGHT GAIN: ICD-10-CM

## 2020-07-02 DIAGNOSIS — Z51.81 THERAPEUTIC DRUG MONITORING: Primary | ICD-10-CM

## 2020-07-02 DIAGNOSIS — N17.9 AKI (ACUTE KIDNEY INJURY) (HCC): ICD-10-CM

## 2020-07-02 PROCEDURE — 3008F BODY MASS INDEX DOCD: CPT | Performed by: NURSE PRACTITIONER

## 2020-07-02 PROCEDURE — 3074F SYST BP LT 130 MM HG: CPT | Performed by: NURSE PRACTITIONER

## 2020-07-02 PROCEDURE — 99214 OFFICE O/P EST MOD 30 MIN: CPT | Performed by: NURSE PRACTITIONER

## 2020-07-02 PROCEDURE — 93000 ELECTROCARDIOGRAM COMPLETE: CPT | Performed by: NURSE PRACTITIONER

## 2020-07-02 PROCEDURE — 3079F DIAST BP 80-89 MM HG: CPT | Performed by: NURSE PRACTITIONER

## 2020-07-02 RX ORDER — PHENTERMINE HYDROCHLORIDE 37.5 MG/1
37.5 TABLET ORAL
Qty: 30 TABLET | Refills: 0 | Status: SHIPPED | OUTPATIENT
Start: 2020-07-02 | End: 2020-07-08

## 2020-07-02 NOTE — PATIENT INSTRUCTIONS
We are here to support you with weight loss, but please remember that you still need your primary care provider for your routine health maintenance.       PLAN:  Will continue with phentermine 37.5mg and topamax 25mg   Follow up with me in 1 month  Schedule ** Daily INPUT> Look at nutrition section-- \"nutrients\" and it will break down your macros for the day (ie. Protein, carbs, fibers, sugars and fats). Try to stay within these numbers daily     2.  \"7 minute workout\" to help with exercise/a

## 2020-07-06 ENCOUNTER — PATIENT MESSAGE (OUTPATIENT)
Dept: INTERNAL MEDICINE CLINIC | Facility: CLINIC | Age: 35
End: 2020-07-06

## 2020-07-07 ENCOUNTER — TELEPHONE (OUTPATIENT)
Dept: INTERNAL MEDICINE CLINIC | Facility: CLINIC | Age: 35
End: 2020-07-07

## 2020-07-07 DIAGNOSIS — E66.01 MORBID OBESITY WITH BMI OF 40.0-44.9, ADULT (HCC): Primary | ICD-10-CM

## 2020-07-07 DIAGNOSIS — Z51.81 THERAPEUTIC DRUG MONITORING: ICD-10-CM

## 2020-07-07 DIAGNOSIS — E11.9 DIABETES MELLITUS, NEW ONSET (HCC): ICD-10-CM

## 2020-07-07 NOTE — TELEPHONE ENCOUNTER
From: Gold Carvalho  To:  JANICE Hunter  Sent: 7/6/2020 8:45 PM CDT  Subject: Prescription Question    Surjit Saenz still has not filled the Phentermine prescription you submitted a few days ago; apparently it's been delayed due to

## 2020-07-08 RX ORDER — PHENTERMINE HYDROCHLORIDE 37.5 MG/1
37.5 TABLET ORAL
Qty: 30 TABLET | Refills: 0 | OUTPATIENT
Start: 2020-07-08 | End: 2020-08-06

## 2020-07-08 NOTE — TELEPHONE ENCOUNTER
Phentermine called to [facility] as requested by patient. I spoke with Fracisco Rodirgues there. I then called Emma to cancel the order sent there - they are cancelling.   Please sign off on called in med to [facility]

## 2020-07-09 RX ORDER — INSULIN GLARGINE 100 [IU]/ML
INJECTION, SOLUTION SUBCUTANEOUS
Qty: 45 ML | Refills: 1 | Status: SHIPPED | OUTPATIENT
Start: 2020-07-09 | End: 2021-02-15

## 2020-07-09 NOTE — TELEPHONE ENCOUNTER
LOV 05/20/2020   Future Appointments   Date Time Provider Cristina Franks   7/13/2020  4:00 PM Edilberto Brady PhD [facility]   7/22/2020  3:00 PM Gary Keita MD [address] [place] [address]   7/30/2020  1:40 PM JANICE Sears [facility]

## 2020-07-23 ENCOUNTER — TELEPHONE (OUTPATIENT)
Dept: INTERNAL MEDICINE CLINIC | Facility: CLINIC | Age: 35
End: 2020-07-23

## 2020-07-23 RX ORDER — ERGOCALCIFEROL 1.25 MG/1
50000 CAPSULE ORAL WEEKLY
Qty: 16 CAPSULE | Refills: 0 | Status: SHIPPED | OUTPATIENT
Start: 2020-07-23 | End: 2021-03-31

## 2020-07-23 NOTE — TELEPHONE ENCOUNTER
Winston Frye got your lab results from [facility]  Vitamin b12 461- normal  Vitamin d 15.7- Low vitamin D level, please start taking ergocalciferol 50,000 U q week 16 weeks (please order quant #16 no refill).  Then start daily maintenance vitamin D 2000 Units

## 2020-07-30 ENCOUNTER — TELEPHONE (OUTPATIENT)
Dept: INTERNAL MEDICINE CLINIC | Facility: CLINIC | Age: 35
End: 2020-07-30

## 2020-07-30 LAB
VITAMIN B12: 461 PG/ML (ref 232–1245)
VITAMIN D, 25-HYDROXY: 15.7 NG/ML (ref 30–100)

## 2020-07-30 NOTE — TELEPHONE ENCOUNTER
Received lab results from [address] [place] [address] for Vitamin B12 and Vitamin Testing. Abstracted lab results. Results placed on provider AD desk for review.

## 2020-08-01 ENCOUNTER — NURSE ONLY (OUTPATIENT)
Dept: ENDOCRINOLOGY CLINIC | Facility: CLINIC | Age: 35
End: 2020-08-01
Payer: COMMERCIAL

## 2020-08-01 DIAGNOSIS — E11.65 TYPE 2 DIABETES MELLITUS WITH HYPERGLYCEMIA, UNSPECIFIED WHETHER LONG TERM INSULIN USE (HCC): Primary | ICD-10-CM

## 2020-08-01 PROCEDURE — G0109 DIAB MANAGE TRN IND/GROUP: HCPCS | Performed by: DIETITIAN, REGISTERED

## 2020-08-01 NOTE — PROGRESS NOTES
Gold Carvalho  [id number] attended Step 2 Class: Pathophysiology of Diabetes, Types of Diabetes, Sources of Carbohydrate, Exercise, Blood Glucose Targets     Date: 8/1/2020  Referring Provider: Deloria Landau  Start time: 9:00 End time: 10:30    The pa

## 2020-08-06 ENCOUNTER — OFFICE VISIT (OUTPATIENT)
Dept: INTERNAL MEDICINE CLINIC | Facility: CLINIC | Age: 35
End: 2020-08-06
Payer: COMMERCIAL

## 2020-08-06 VITALS
WEIGHT: 291 LBS | HEART RATE: 104 BPM | HEIGHT: 70 IN | SYSTOLIC BLOOD PRESSURE: 118 MMHG | DIASTOLIC BLOOD PRESSURE: 80 MMHG | TEMPERATURE: 98 F | BODY MASS INDEX: 41.66 KG/M2 | RESPIRATION RATE: 16 BRPM

## 2020-08-06 DIAGNOSIS — E55.9 VITAMIN D DEFICIENCY: ICD-10-CM

## 2020-08-06 DIAGNOSIS — E11.9 TYPE 2 DIABETES MELLITUS WITHOUT COMPLICATION, WITH LONG-TERM CURRENT USE OF INSULIN (HCC): ICD-10-CM

## 2020-08-06 DIAGNOSIS — E78.2 MIXED HYPERLIPIDEMIA: ICD-10-CM

## 2020-08-06 DIAGNOSIS — Z51.81 THERAPEUTIC DRUG MONITORING: Primary | ICD-10-CM

## 2020-08-06 DIAGNOSIS — Z79.4 TYPE 2 DIABETES MELLITUS WITHOUT COMPLICATION, WITH LONG-TERM CURRENT USE OF INSULIN (HCC): ICD-10-CM

## 2020-08-06 PROCEDURE — 3008F BODY MASS INDEX DOCD: CPT | Performed by: PHYSICIAN ASSISTANT

## 2020-08-06 PROCEDURE — 99214 OFFICE O/P EST MOD 30 MIN: CPT | Performed by: PHYSICIAN ASSISTANT

## 2020-08-06 PROCEDURE — 3074F SYST BP LT 130 MM HG: CPT | Performed by: PHYSICIAN ASSISTANT

## 2020-08-06 PROCEDURE — 3079F DIAST BP 80-89 MM HG: CPT | Performed by: PHYSICIAN ASSISTANT

## 2020-08-06 RX ORDER — TOPIRAMATE 25 MG/1
25 TABLET ORAL 2 TIMES DAILY
Qty: 60 TABLET | Refills: 1 | Status: SHIPPED | OUTPATIENT
Start: 2020-08-06 | End: 2020-09-27

## 2020-08-06 RX ORDER — PHENTERMINE HYDROCHLORIDE 37.5 MG/1
37.5 TABLET ORAL
Qty: 30 TABLET | Refills: 0 | Status: SHIPPED | OUTPATIENT
Start: 2020-08-06 | End: 2020-09-27

## 2020-08-06 NOTE — PROGRESS NOTES
HISTORY OF PRESENT ILLNESS  Patient presents with:  Weight Check: lost 12      Havenwestley Larose is a 36year old male here for follow up in medical weight loss program.   Currently on phentermine and topamax  Down 12lbs  Has been writing down food, has been BUNCREA 13.3 04/30/2020    CREATSERUM 0.98 04/30/2020    ANIONGAP 9 04/30/2020    GFRNAA 101 04/30/2020    GFRAA 117 04/30/2020    CA 8.7 04/30/2020    OSMOCALC 287 04/30/2020    ALKPHO 98 04/27/2020    AST 20 04/27/2020    ALT 55 04/27/2020    BILT 0.7 04 Disp: 1 kit, Rfl: 0  OneTouch Delica Lancets Fine Does not apply Misc, Test before meals; signs/symptoms of hypoglycemia, Disp: 100 each, Rfl: 2    No current facility-administered medications on file prior to visit.        ASSESSMENT  Analyzed weight data: Patient Instructions on file for this visit. No follow-ups on file. Patient verbalizes understanding.     Rowan Barrera PA-C  8/6/2020

## 2020-08-12 ENCOUNTER — OFFICE VISIT (OUTPATIENT)
Dept: INTERNAL MEDICINE CLINIC | Facility: CLINIC | Age: 35
End: 2020-08-12
Payer: COMMERCIAL

## 2020-08-12 DIAGNOSIS — E66.01 MORBID OBESITY WITH BMI OF 40.0-44.9, ADULT (HCC): ICD-10-CM

## 2020-08-12 DIAGNOSIS — E11.9 DIABETES MELLITUS, NEW ONSET (HCC): Chronic | ICD-10-CM

## 2020-08-12 PROCEDURE — 97802 MEDICAL NUTRITION INDIV IN: CPT | Performed by: DIETITIAN, REGISTERED

## 2020-08-13 VITALS — BODY MASS INDEX: 42 KG/M2 | WEIGHT: 290 LBS

## 2020-08-13 NOTE — PROGRESS NOTES
INITIAL OUTPATIENT NUTRITION CONSULTATION    Nutrition Assessment    Medical Diagnosis: Obesity and Type 2 DM     Client Age and Gender: 36year old male    Marital Status and Occupation:       Meds:  Phentermine HCl 37.5 MG Oral Tab, T Direct HDL   Date Value Ref Range Status   07/21/2020 35 (L) >=40 mg/dL Final     Comment:     Guidelines for high density lipoprotein (HDL) are adapted from the [employer] [place] Cholesterol Education Program (NCEP). Values >=40 mg/dL are considered a negative Pre meal BG of 112-117 typically with high of 135. Last A1C was 5.8 on 7/22, down from 11.6.     Estimated current caloric intake: 1800 cals/d    Estimated caloric needs for weight loss: 1700 cals/d for 2 pounds/week weight loss, 150 gms carbohydrate (35%

## 2020-08-29 ENCOUNTER — NURSE ONLY (OUTPATIENT)
Dept: ENDOCRINOLOGY CLINIC | Facility: CLINIC | Age: 35
End: 2020-08-29
Payer: COMMERCIAL

## 2020-08-29 DIAGNOSIS — E11.65 TYPE 2 DIABETES MELLITUS WITH HYPERGLYCEMIA, UNSPECIFIED WHETHER LONG TERM INSULIN USE (HCC): Primary | ICD-10-CM

## 2020-08-29 PROCEDURE — G0109 DIAB MANAGE TRN IND/GROUP: HCPCS | Performed by: DIETITIAN, REGISTERED

## 2020-08-29 NOTE — PROGRESS NOTES
Darell   [id number] attended Step 3 Class: Carbohydrate Counting, Medications, Treating Highs/Lows    Date: 8/29/2020  Referring Provider: Herbert Corona  Start time: 9:00 End time: 11:00    The patient participated during the class: Pt

## 2020-09-12 ENCOUNTER — NURSE ONLY (OUTPATIENT)
Dept: ENDOCRINOLOGY CLINIC | Facility: CLINIC | Age: 35
End: 2020-09-12
Payer: COMMERCIAL

## 2020-09-12 DIAGNOSIS — E11.65 TYPE 2 DIABETES MELLITUS WITH HYPERGLYCEMIA, UNSPECIFIED WHETHER LONG TERM INSULIN USE (HCC): Primary | ICD-10-CM

## 2020-09-12 PROCEDURE — G0109 DIAB MANAGE TRN IND/GROUP: HCPCS | Performed by: DIETITIAN, REGISTERED

## 2020-09-12 NOTE — PROGRESS NOTES
Zachary Escalona  [id number] attended Step 4 Class: Complications, Special Occasion Eating  Date: 9/12/2020  Referring Provider: Dr Jenny Barnard  Start time: 9:00 End time: 10:30    The patient participated during the class: Patient was able to identify ways to r

## 2020-09-25 ENCOUNTER — OFFICE VISIT (OUTPATIENT)
Dept: INTERNAL MEDICINE CLINIC | Facility: CLINIC | Age: 35
End: 2020-09-25
Payer: COMMERCIAL

## 2020-09-25 VITALS
RESPIRATION RATE: 16 BRPM | SYSTOLIC BLOOD PRESSURE: 116 MMHG | BODY MASS INDEX: 39.8 KG/M2 | HEIGHT: 70 IN | DIASTOLIC BLOOD PRESSURE: 84 MMHG | TEMPERATURE: 99 F | WEIGHT: 278 LBS | HEART RATE: 120 BPM

## 2020-09-25 DIAGNOSIS — E78.2 MIXED HYPERLIPIDEMIA: ICD-10-CM

## 2020-09-25 DIAGNOSIS — Z51.81 THERAPEUTIC DRUG MONITORING: Primary | ICD-10-CM

## 2020-09-25 DIAGNOSIS — Z23 NEED FOR VACCINATION: ICD-10-CM

## 2020-09-25 DIAGNOSIS — Z79.4 TYPE 2 DIABETES MELLITUS WITHOUT COMPLICATION, WITH LONG-TERM CURRENT USE OF INSULIN (HCC): ICD-10-CM

## 2020-09-25 DIAGNOSIS — E11.9 TYPE 2 DIABETES MELLITUS WITHOUT COMPLICATION, WITH LONG-TERM CURRENT USE OF INSULIN (HCC): ICD-10-CM

## 2020-09-25 PROCEDURE — 90471 IMMUNIZATION ADMIN: CPT | Performed by: INTERNAL MEDICINE

## 2020-09-25 PROCEDURE — 3074F SYST BP LT 130 MM HG: CPT | Performed by: INTERNAL MEDICINE

## 2020-09-25 PROCEDURE — 90686 IIV4 VACC NO PRSV 0.5 ML IM: CPT | Performed by: INTERNAL MEDICINE

## 2020-09-25 PROCEDURE — 3008F BODY MASS INDEX DOCD: CPT | Performed by: INTERNAL MEDICINE

## 2020-09-25 PROCEDURE — 3079F DIAST BP 80-89 MM HG: CPT | Performed by: INTERNAL MEDICINE

## 2020-09-25 PROCEDURE — 99214 OFFICE O/P EST MOD 30 MIN: CPT | Performed by: PHYSICIAN ASSISTANT

## 2020-09-25 NOTE — PROGRESS NOTES
HISTORY OF PRESENT ILLNESS  Patient presents with:  Weight Check: down 13lbs      Rukhsana Voss is a 30year old male here for follow up in medical weight loss program.   Down 13lbs  Compliant on phentermine and topamax  Has not been logging foods as cons CREATSERUM 0.98 04/30/2020    ANIONGAP 9 04/30/2020    GFRNAA 101 04/30/2020    GFRAA 117 04/30/2020    CA 8.7 04/30/2020    OSMOCALC 287 04/30/2020    ALKPHO 98 04/27/2020    AST 20 04/27/2020    ALT 55 04/27/2020    BILT 0.7 04/27/2020    TP 9.6 (H) 04/2 hypoglycemia, Disp: 1 kit, Rfl: 0    •  OneTouch Delica Lancets Fine Does not apply Misc, Test before meals; signs/symptoms of hypoglycemia, Disp: 100 each, Rfl: 2    No current facility-administered medications on file prior to visit.        ASSESSMENT  An physical activity outside of walking dog  · Weight Loss consent to treat reviewed and signed     There are no Patient Instructions on file for this visit. Return in about 4 weeks (around 10/23/2020) for weight management.     Patient verbalizes Kenny Kaye

## 2020-09-26 ENCOUNTER — NURSE ONLY (OUTPATIENT)
Dept: ENDOCRINOLOGY CLINIC | Facility: CLINIC | Age: 35
End: 2020-09-26
Payer: COMMERCIAL

## 2020-09-26 DIAGNOSIS — E11.65 TYPE 2 DIABETES MELLITUS WITH HYPERGLYCEMIA, UNSPECIFIED WHETHER LONG TERM INSULIN USE (HCC): Primary | ICD-10-CM

## 2020-09-26 PROCEDURE — G0109 DIAB MANAGE TRN IND/GROUP: HCPCS | Performed by: DIETITIAN, REGISTERED

## 2020-09-26 NOTE — PROGRESS NOTES
Richard Thomas  [id number] attended Step 5 Class: Heart Healthy Diet, Exercise, Stress Management    Date: 9/26/2020  Referring Provider: Dr Oneida Rice  Start time: 9:00 End time: 10:30    The patient participated during the class: Patient verbalized dietary

## 2020-09-27 RX ORDER — TOPIRAMATE 25 MG/1
25 TABLET ORAL 2 TIMES DAILY
Qty: 60 TABLET | Refills: 1 | Status: SHIPPED | OUTPATIENT
Start: 2020-09-27 | End: 2020-11-26

## 2020-09-27 RX ORDER — PHENTERMINE HYDROCHLORIDE 37.5 MG/1
37.5 TABLET ORAL
Qty: 30 TABLET | Refills: 0 | Status: SHIPPED | OUTPATIENT
Start: 2020-09-27 | End: 2020-11-30

## 2020-09-28 ENCOUNTER — TELEPHONE (OUTPATIENT)
Dept: INTERNAL MEDICINE CLINIC | Facility: CLINIC | Age: 35
End: 2020-09-28

## 2020-09-28 NOTE — TELEPHONE ENCOUNTER
Epic request to get PA for Phentermine. Questions answered and denied  Your PA request has been denied. Additional information will be provided in the denial communication.  (Message [phone])   Case ID: [id number]      Payer:  [address] [place] [address]    [phone]

## 2020-10-26 ENCOUNTER — TELEPHONE (OUTPATIENT)
Dept: INTERNAL MEDICINE CLINIC | Facility: CLINIC | Age: 35
End: 2020-10-26

## 2020-10-26 DIAGNOSIS — Z13.220 SCREENING, LIPID: ICD-10-CM

## 2020-10-26 DIAGNOSIS — Z13.228 SCREENING FOR ENDOCRINE, METABOLIC AND IMMUNITY DISORDER: ICD-10-CM

## 2020-10-26 DIAGNOSIS — Z00.00 ROUTINE GENERAL MEDICAL EXAMINATION AT A HEALTH CARE FACILITY: Primary | ICD-10-CM

## 2020-10-26 DIAGNOSIS — Z13.0 SCREENING FOR ENDOCRINE, METABOLIC AND IMMUNITY DISORDER: ICD-10-CM

## 2020-10-26 DIAGNOSIS — Z13.29 SCREENING FOR ENDOCRINE, METABOLIC AND IMMUNITY DISORDER: ICD-10-CM

## 2020-10-26 DIAGNOSIS — Z13.0 SCREENING FOR BLOOD DISEASE: ICD-10-CM

## 2020-10-26 DIAGNOSIS — Z13.29 SCREENING FOR THYROID DISORDER: ICD-10-CM

## 2020-10-26 NOTE — TELEPHONE ENCOUNTER
CPE   Future Appointments   Date Time Provider Cristina Tiny   11/13/2020  2:20 PM Salome Dong MD [facility] [facility]        Orders to  [facility]   aware must fast no call back required

## 2020-11-08 NOTE — TELEPHONE ENCOUNTER
Requesting Phentermine 37.5 mg  LOV: 9/25/20  RTC: one month  Last Relevant Labs: na  Filled: 9/27/20 #30 with 0 refills  [place]20 [place]    Due for a visit. My chart sent.

## 2020-11-11 RX ORDER — PHENTERMINE HYDROCHLORIDE 37.5 MG/1
TABLET ORAL
Qty: 30 TABLET | Refills: 0 | OUTPATIENT
Start: 2020-11-11

## 2020-11-11 NOTE — TELEPHONE ENCOUNTER
No appt still. I tried to call patient to schedule and got voicemail but the mailbox is full.   I denied this refill so he will have to call us and schedule

## 2020-11-25 ENCOUNTER — TELEMEDICINE (OUTPATIENT)
Dept: INTERNAL MEDICINE CLINIC | Facility: CLINIC | Age: 35
End: 2020-11-25
Payer: COMMERCIAL

## 2020-11-25 DIAGNOSIS — R00.0 ELEVATED PULSE RATE: ICD-10-CM

## 2020-11-25 DIAGNOSIS — E78.2 MIXED HYPERLIPIDEMIA: ICD-10-CM

## 2020-11-25 DIAGNOSIS — Z51.81 THERAPEUTIC DRUG MONITORING: Primary | ICD-10-CM

## 2020-11-25 DIAGNOSIS — E11.9 TYPE 2 DIABETES MELLITUS WITHOUT COMPLICATION, WITH LONG-TERM CURRENT USE OF INSULIN (HCC): ICD-10-CM

## 2020-11-25 DIAGNOSIS — Z79.4 TYPE 2 DIABETES MELLITUS WITHOUT COMPLICATION, WITH LONG-TERM CURRENT USE OF INSULIN (HCC): ICD-10-CM

## 2020-11-25 PROCEDURE — 99214 OFFICE O/P EST MOD 30 MIN: CPT | Performed by: NURSE PRACTITIONER

## 2020-11-25 NOTE — PROGRESS NOTES
Virtual Telephone Check-In    Aimee Willis verbally consents to a Virtual/Telephone Check-In visit on 11/25/2020. Patient understands and accepts financial responsibility for any deductible, co-insurance and/or co-pays associated with this service.   V Exam:  Appropriate affect and mood, normal logic and thought process   Patient speaking in full sentences, no increased work of breathing    Assessment/Plan:   Diagnoses and all orders for this visit:    Therapeutic drug monitoring  BMI 40.0-44.9, adult (H limitations of this visit as no physical exam could be performed. Every conscious effort was taken to allow for sufficient and adequate time. This billing was spent on reviewing labs, medications, radiology tests and decision making.   Appropriate medical

## 2020-11-26 VITALS — BODY MASS INDEX: 38 KG/M2 | WEIGHT: 265 LBS

## 2020-11-26 RX ORDER — TOPIRAMATE 25 MG/1
25 TABLET ORAL 2 TIMES DAILY
Qty: 60 TABLET | Refills: 1 | Status: SHIPPED | OUTPATIENT
Start: 2020-11-26 | End: 2020-12-29

## 2020-11-26 NOTE — PATIENT INSTRUCTIONS
We are here to support you with weight loss, but please remember that you still need your primary care provider for your routine health maintenance.       PLAN:  Continue with phentermine and topamax   Send in pulse in the next couple of days  Follow up wit Activity level: not very active (can't count exercise towards calorie number per day)                   ** Daily INPUT> Look at nutrition section-- \"nutrients\" and it will break down your macros for the day (ie.  Protein, carbs, fibers, suga

## 2020-11-30 ENCOUNTER — PATIENT MESSAGE (OUTPATIENT)
Dept: INTERNAL MEDICINE CLINIC | Facility: CLINIC | Age: 35
End: 2020-11-30

## 2020-11-30 NOTE — TELEPHONE ENCOUNTER
From: Zachary Escalona  To: JANICE Mcgill  Sent: 11/30/2020 9:27 AM CST  Subject: Visit Follow-up Question    Surjit Maldonado for the delay in getting this information to you.  Below are my morning heart rate numbers from Thursday to Sunday

## 2020-12-01 RX ORDER — PHENTERMINE HYDROCHLORIDE 37.5 MG/1
37.5 TABLET ORAL
Qty: 30 TABLET | Refills: 0 | Status: SHIPPED | OUTPATIENT
Start: 2020-12-01 | End: 2020-12-29

## 2020-12-01 NOTE — TELEPHONE ENCOUNTER
Requesting   Requested Prescriptions     Pending Prescriptions Disp Refills   • Phentermine HCl 37.5 MG Oral Tab 30 tablet 0     Sig: Take 1 tablet (37.5 mg total) by mouth every morning before breakfast.     LOV:11/25/20  RTC:4 weeks  Filled: 9/27/20 #30

## 2020-12-05 ENCOUNTER — NURSE ONLY (OUTPATIENT)
Dept: ENDOCRINOLOGY CLINIC | Facility: CLINIC | Age: 35
End: 2020-12-05

## 2020-12-05 DIAGNOSIS — E11.9 DIABETES MELLITUS, NEW ONSET (HCC): Chronic | ICD-10-CM

## 2020-12-05 PROCEDURE — G0109 DIAB MANAGE TRN IND/GROUP: HCPCS | Performed by: DIETITIAN, REGISTERED

## 2020-12-07 NOTE — PROGRESS NOTES
Torin Mayte Robles  [id number] attended Step 6 Class:  3 Month   Due to [address], the patient's office visit was conducted via real-time interactive audio and video.      The patient verbally consents to an audio and video consultation toda ([facility]AYLEY) – (depression, bipolar and other support)                    [phone], www.kayley. org      [] Depression & Bipolar Support [place] – 541---[phone] – www. [employer]. org      [] Anxiety [employer] Depression [employer] [place] – find local support covered.     Sunitha Robles RN, CDE

## 2020-12-11 ENCOUNTER — LAB ENCOUNTER (OUTPATIENT)
Dept: LAB | Age: 35
End: 2020-12-11
Attending: INTERNAL MEDICINE
Payer: COMMERCIAL

## 2020-12-11 DIAGNOSIS — Z13.0 SCREENING FOR ENDOCRINE, METABOLIC AND IMMUNITY DISORDER: ICD-10-CM

## 2020-12-11 DIAGNOSIS — Z13.29 SCREENING FOR THYROID DISORDER: ICD-10-CM

## 2020-12-11 DIAGNOSIS — Z00.00 ROUTINE GENERAL MEDICAL EXAMINATION AT A HEALTH CARE FACILITY: ICD-10-CM

## 2020-12-11 DIAGNOSIS — Z13.29 SCREENING FOR ENDOCRINE, METABOLIC AND IMMUNITY DISORDER: ICD-10-CM

## 2020-12-11 DIAGNOSIS — Z13.0 SCREENING FOR BLOOD DISEASE: ICD-10-CM

## 2020-12-11 DIAGNOSIS — Z13.220 SCREENING, LIPID: ICD-10-CM

## 2020-12-11 DIAGNOSIS — Z13.228 SCREENING FOR ENDOCRINE, METABOLIC AND IMMUNITY DISORDER: ICD-10-CM

## 2020-12-11 PROCEDURE — 36415 COLL VENOUS BLD VENIPUNCTURE: CPT | Performed by: INTERNAL MEDICINE

## 2020-12-11 PROCEDURE — 80050 GENERAL HEALTH PANEL: CPT | Performed by: INTERNAL MEDICINE

## 2020-12-11 PROCEDURE — 80061 LIPID PANEL: CPT | Performed by: INTERNAL MEDICINE

## 2020-12-18 ENCOUNTER — OFFICE VISIT (OUTPATIENT)
Dept: INTERNAL MEDICINE CLINIC | Facility: CLINIC | Age: 35
End: 2020-12-18
Payer: COMMERCIAL

## 2020-12-18 VITALS
DIASTOLIC BLOOD PRESSURE: 78 MMHG | BODY MASS INDEX: 37.66 KG/M2 | HEART RATE: 88 BPM | TEMPERATURE: 97 F | SYSTOLIC BLOOD PRESSURE: 124 MMHG | HEIGHT: 70.47 IN | WEIGHT: 266 LBS

## 2020-12-18 DIAGNOSIS — E11.9 TYPE 2 DIABETES MELLITUS WITHOUT COMPLICATION, WITH LONG-TERM CURRENT USE OF INSULIN (HCC): ICD-10-CM

## 2020-12-18 DIAGNOSIS — Z79.4 TYPE 2 DIABETES MELLITUS WITHOUT COMPLICATION, WITH LONG-TERM CURRENT USE OF INSULIN (HCC): ICD-10-CM

## 2020-12-18 DIAGNOSIS — E66.9 CLASS 2 OBESITY: ICD-10-CM

## 2020-12-18 DIAGNOSIS — Z00.00 ROUTINE GENERAL MEDICAL EXAMINATION AT A HEALTH CARE FACILITY: Primary | ICD-10-CM

## 2020-12-18 DIAGNOSIS — E78.5 DYSLIPIDEMIA: ICD-10-CM

## 2020-12-18 PROCEDURE — 3008F BODY MASS INDEX DOCD: CPT | Performed by: INTERNAL MEDICINE

## 2020-12-18 PROCEDURE — 99395 PREV VISIT EST AGE 18-39: CPT | Performed by: INTERNAL MEDICINE

## 2020-12-18 PROCEDURE — 3078F DIAST BP <80 MM HG: CPT | Performed by: INTERNAL MEDICINE

## 2020-12-18 PROCEDURE — 90732 PPSV23 VACC 2 YRS+ SUBQ/IM: CPT | Performed by: INTERNAL MEDICINE

## 2020-12-18 PROCEDURE — 90471 IMMUNIZATION ADMIN: CPT | Performed by: INTERNAL MEDICINE

## 2020-12-18 PROCEDURE — 3074F SYST BP LT 130 MM HG: CPT | Performed by: INTERNAL MEDICINE

## 2020-12-18 RX ORDER — EMPAGLIFLOZIN 25 MG/1
1 TABLET, FILM COATED ORAL DAILY
COMMUNITY
Start: 2020-10-24 | End: 2021-01-25

## 2020-12-18 NOTE — PROGRESS NOTES
Torin Tor Ari  9/21/1985    Patient presents with:  Physical: AJ rm 7 annual PE      HPI:   Erin Monsalve is a 30year old male who presents for an annual physical examination.     The patient has been in his usual state of health and denies mellitus ([facility] [address]     Metabolic acidosis due to diabetes mellitus (HCC)     Dehydration     Leukocytosis     Diabetes mellitus, new onset (HCC)     Pes planus     Mechanical low back pain     Obesity (BMI 30-39. 9)     History reviewed.  No pertinent surgical h months    Return in 3 months for dyslipidemia    The patient indicates understanding of these issues and agrees to the plan.     TODAY'S ORDERS     Orders Placed This Encounter      Pneumococcal 23, Adult (Pneumovax) (81521) (Dx V03.82/Z23)      Meds & Refi

## 2020-12-29 ENCOUNTER — OFFICE VISIT (OUTPATIENT)
Dept: INTERNAL MEDICINE CLINIC | Facility: CLINIC | Age: 35
End: 2020-12-29
Payer: COMMERCIAL

## 2020-12-29 VITALS
SYSTOLIC BLOOD PRESSURE: 118 MMHG | BODY MASS INDEX: 37.23 KG/M2 | RESPIRATION RATE: 16 BRPM | OXYGEN SATURATION: 98 % | HEART RATE: 85 BPM | WEIGHT: 263 LBS | HEIGHT: 70.47 IN | DIASTOLIC BLOOD PRESSURE: 76 MMHG

## 2020-12-29 DIAGNOSIS — E11.9 TYPE 2 DIABETES MELLITUS WITHOUT COMPLICATION, WITH LONG-TERM CURRENT USE OF INSULIN (HCC): ICD-10-CM

## 2020-12-29 DIAGNOSIS — E55.9 VITAMIN D DEFICIENCY: ICD-10-CM

## 2020-12-29 DIAGNOSIS — R00.0 ELEVATED PULSE RATE: ICD-10-CM

## 2020-12-29 DIAGNOSIS — E66.9 OBESITY (BMI 30-39.9): ICD-10-CM

## 2020-12-29 DIAGNOSIS — E78.5 DYSLIPIDEMIA: ICD-10-CM

## 2020-12-29 DIAGNOSIS — Z51.81 THERAPEUTIC DRUG MONITORING: Primary | ICD-10-CM

## 2020-12-29 DIAGNOSIS — Z79.4 TYPE 2 DIABETES MELLITUS WITHOUT COMPLICATION, WITH LONG-TERM CURRENT USE OF INSULIN (HCC): ICD-10-CM

## 2020-12-29 PROCEDURE — 99214 OFFICE O/P EST MOD 30 MIN: CPT | Performed by: NURSE PRACTITIONER

## 2020-12-29 PROCEDURE — 3008F BODY MASS INDEX DOCD: CPT | Performed by: NURSE PRACTITIONER

## 2020-12-29 PROCEDURE — 3074F SYST BP LT 130 MM HG: CPT | Performed by: NURSE PRACTITIONER

## 2020-12-29 PROCEDURE — 3078F DIAST BP <80 MM HG: CPT | Performed by: NURSE PRACTITIONER

## 2020-12-29 RX ORDER — TOPIRAMATE 25 MG/1
25 TABLET ORAL 2 TIMES DAILY
Qty: 180 TABLET | Refills: 0 | Status: SHIPPED | OUTPATIENT
Start: 2020-12-29 | End: 2021-03-22

## 2020-12-29 RX ORDER — PHENTERMINE HYDROCHLORIDE 37.5 MG/1
37.5 TABLET ORAL
Qty: 30 TABLET | Refills: 1 | Status: SHIPPED | OUTPATIENT
Start: 2020-12-29 | End: 2021-02-17

## 2020-12-29 NOTE — PROGRESS NOTES
HISTORY OF PRESENT ILLNESS  Patient presents with: Follow - Up: down 2    Torin Pheobe Hair is a 30year old male here for follow up with medical weight loss program for the treatment of overweight, obesity, or morbid obesity.      Down 2 lbs  Complian feeling sad or depressed    EXAM  /76 (BP Location: Left arm, Patient Position: Sitting, Cuff Size: large)   Pulse 85   Resp 16   Ht 5' 10.47\" (1.79 m)   Wt 263 lb (119.3 kg)   SpO2 98%   BMI 37.23 kg/m²       GENERAL: well developed, well nourished capsule, Rfl: 0    •  BASAGLAR KWIKPEN 100 UNIT/ML Subcutaneous Solution Pen-injector, ADMINISTER 40 UNITS UNDER THE SKIN EVERY MORNING, Disp: 45 mL, Rfl: 1    •  Insulin Pen Needle 33G X 4 MM Does not apply Misc, 1 each by Does not apply route 4 (four) ti can decrease insulin since fasting blood sugars 80-85)  · Pulse (initally was 98, waited till after the appt and it went down to 85)  · Nutrition: Low carb diet, recommended to eat breakfast daily/ regular protein intake  · Follow up with dietitian and psy

## 2020-12-30 NOTE — PATIENT INSTRUCTIONS
We are here to support you with weight loss, but please remember that you still need your primary care provider for your routine health maintenance.       PLAN:  Will continue with phentermine and topamax   Follow up with me in 6 weeks   Schedule follow up ** Daily INPUT> Look at nutrition section-- \"nutrients\" and it will break down your macros for the day (ie. Protein, carbs, fibers, sugars and fats). Try to stay within these numbers daily     2.  \"7 minute workout\" to help with exercise/a

## 2021-01-20 ENCOUNTER — OFFICE VISIT (OUTPATIENT)
Dept: INTERNAL MEDICINE CLINIC | Facility: CLINIC | Age: 36
End: 2021-01-20
Payer: COMMERCIAL

## 2021-01-20 DIAGNOSIS — E11.9 DIABETES MELLITUS, NEW ONSET (HCC): Chronic | ICD-10-CM

## 2021-01-20 DIAGNOSIS — E66.9 OBESITY (BMI 30-39.9): ICD-10-CM

## 2021-01-20 PROCEDURE — 97803 MED NUTRITION INDIV SUBSEQ: CPT | Performed by: DIETITIAN, REGISTERED

## 2021-01-22 VITALS — BODY MASS INDEX: 37 KG/M2 | WEIGHT: 262 LBS

## 2021-01-22 NOTE — PROGRESS NOTES
FOLLOW UP NUTRITION CONSULTATION    Nutrition Assessment    Number of consultations with dietitian: 2    Height:  Ht Readings from Last 1 Encounters:  12/29/20 : 5' 10.47\" (1.79 m)      Weight:   Wt Readings from Last 2 Encounters:  01/20/21 : 262 lb ( goals below. Goals:  • Increase protein intake to 90 gms/d  • Eat food containing 15 gms carb prior to exercise  • Follow up with endocrinologist regarding low blood sugars and adjusting insulin  • Increase physical activity    Monitoring/Evaluation:  Fo

## 2021-02-03 PROCEDURE — 3044F HG A1C LEVEL LT 7.0%: CPT | Performed by: NURSE PRACTITIONER

## 2021-02-17 ENCOUNTER — OFFICE VISIT (OUTPATIENT)
Dept: INTERNAL MEDICINE CLINIC | Facility: CLINIC | Age: 36
End: 2021-02-17
Payer: COMMERCIAL

## 2021-02-17 VITALS
RESPIRATION RATE: 16 BRPM | HEIGHT: 70 IN | WEIGHT: 256 LBS | DIASTOLIC BLOOD PRESSURE: 84 MMHG | SYSTOLIC BLOOD PRESSURE: 126 MMHG | HEART RATE: 96 BPM | BODY MASS INDEX: 36.65 KG/M2

## 2021-02-17 DIAGNOSIS — Z51.81 THERAPEUTIC DRUG MONITORING: Primary | ICD-10-CM

## 2021-02-17 DIAGNOSIS — E66.9 OBESITY (BMI 30-39.9): ICD-10-CM

## 2021-02-17 DIAGNOSIS — E11.9 TYPE 2 DIABETES MELLITUS WITHOUT COMPLICATION, WITH LONG-TERM CURRENT USE OF INSULIN (HCC): ICD-10-CM

## 2021-02-17 DIAGNOSIS — E78.2 MIXED HYPERLIPIDEMIA: ICD-10-CM

## 2021-02-17 DIAGNOSIS — Z79.4 TYPE 2 DIABETES MELLITUS WITHOUT COMPLICATION, WITH LONG-TERM CURRENT USE OF INSULIN (HCC): ICD-10-CM

## 2021-02-17 DIAGNOSIS — E55.9 VITAMIN D DEFICIENCY: ICD-10-CM

## 2021-02-17 PROCEDURE — 3079F DIAST BP 80-89 MM HG: CPT | Performed by: NURSE PRACTITIONER

## 2021-02-17 PROCEDURE — 99214 OFFICE O/P EST MOD 30 MIN: CPT | Performed by: NURSE PRACTITIONER

## 2021-02-17 PROCEDURE — 3008F BODY MASS INDEX DOCD: CPT | Performed by: NURSE PRACTITIONER

## 2021-02-17 PROCEDURE — 3074F SYST BP LT 130 MM HG: CPT | Performed by: NURSE PRACTITIONER

## 2021-02-17 RX ORDER — PHENTERMINE HYDROCHLORIDE 37.5 MG/1
37.5 TABLET ORAL
Qty: 30 TABLET | Refills: 1 | Status: SHIPPED | OUTPATIENT
Start: 2021-02-17 | End: 2021-03-31

## 2021-02-17 NOTE — PATIENT INSTRUCTIONS
We are here to support you with weight loss, but please remember that you still need your primary care provider for your routine health maintenance.       PLAN:  Continue with phentermine and topamax   Follow up with me in 6 weeks  Schedule follow up appoin INPUT> Look at nutrition section-- \"nutrients\" and it will break down your macros for the day (ie. Protein, carbs, fibers, sugars and fats). Try to stay within these numbers daily     2.  \"7 minute workout\" to help with exercise/activity which takes 7 m

## 2021-02-17 NOTE — PROGRESS NOTES
HISTORY OF PRESENT ILLNESS  Patient presents with:  Weight Check: down 7 lbs     Torin Donn Roy is a 30year old male here for follow up with medical weight loss program for the treatment of overweight, obesity, or morbid obesity.      Down 7 lbs  Co x3  SKIN: no rashes, no suspicious lesions  HEENT: conjunctiva pink, sclera non icteric, PERRLA  NECK: supple, no adenopathy  LUNGS: CTA in all fields, breathing non labored  CARDIO: RRR without murmur  GI: +BS, NT/ND, no masses or HSM  EXTREMITIES: no cya Does not apply Misc, 1 each by Does not apply route 4 (four) times daily. , Disp: 400 each, Rfl: 3    •  Glucose Blood (ONETOUCH VERIO) In Vitro Strip, Test before meals; signs/symptoms of hypoglycemia, Disp: 100 strip, Rfl: 2    •  Blood Glucose Monitoring patient instruction below for more details.   · Discussed strategies to overcome barriers to successful weight loss and weight maintenance  · FITTE: ACSM recommendations (150-300 minutes/ week in active weight loss)   · Weight Loss Consent to treat reviewed

## 2021-03-22 DIAGNOSIS — Z51.81 THERAPEUTIC DRUG MONITORING: ICD-10-CM

## 2021-03-22 DIAGNOSIS — E66.9 OBESITY (BMI 30-39.9): ICD-10-CM

## 2021-03-22 RX ORDER — TOPIRAMATE 25 MG/1
25 TABLET ORAL 2 TIMES DAILY
Qty: 180 TABLET | Refills: 0 | Status: SHIPPED | OUTPATIENT
Start: 2021-03-22 | End: 2021-05-25

## 2021-03-22 NOTE — TELEPHONE ENCOUNTER
Requesting Topiramate 25 mg  LOV: 2/17/21  RTC: one month  Last Relevant Labs: na  Filled: 12/29/20 #180 with 0 refills    Future Appointments   Date Time Provider Cristina Franks   3/31/2021  4:00 PM JANICE Cassidy [facility]   11/9/2

## 2021-03-31 ENCOUNTER — OFFICE VISIT (OUTPATIENT)
Dept: INTERNAL MEDICINE CLINIC | Facility: CLINIC | Age: 36
End: 2021-03-31
Payer: COMMERCIAL

## 2021-03-31 VITALS
DIASTOLIC BLOOD PRESSURE: 72 MMHG | HEART RATE: 78 BPM | RESPIRATION RATE: 14 BRPM | SYSTOLIC BLOOD PRESSURE: 110 MMHG | WEIGHT: 249 LBS | BODY MASS INDEX: 35.65 KG/M2 | HEIGHT: 70 IN

## 2021-03-31 DIAGNOSIS — E55.9 VITAMIN D DEFICIENCY: ICD-10-CM

## 2021-03-31 DIAGNOSIS — Z51.81 THERAPEUTIC DRUG MONITORING: Primary | ICD-10-CM

## 2021-03-31 DIAGNOSIS — E66.9 OBESITY (BMI 30-39.9): ICD-10-CM

## 2021-03-31 DIAGNOSIS — E78.2 MIXED HYPERLIPIDEMIA: ICD-10-CM

## 2021-03-31 DIAGNOSIS — E11.9 TYPE 2 DIABETES MELLITUS WITHOUT COMPLICATION, WITH LONG-TERM CURRENT USE OF INSULIN (HCC): ICD-10-CM

## 2021-03-31 DIAGNOSIS — Z79.4 TYPE 2 DIABETES MELLITUS WITHOUT COMPLICATION, WITH LONG-TERM CURRENT USE OF INSULIN (HCC): ICD-10-CM

## 2021-03-31 PROCEDURE — 3074F SYST BP LT 130 MM HG: CPT | Performed by: NURSE PRACTITIONER

## 2021-03-31 PROCEDURE — 3008F BODY MASS INDEX DOCD: CPT | Performed by: NURSE PRACTITIONER

## 2021-03-31 PROCEDURE — 3078F DIAST BP <80 MM HG: CPT | Performed by: NURSE PRACTITIONER

## 2021-03-31 PROCEDURE — 99213 OFFICE O/P EST LOW 20 MIN: CPT | Performed by: NURSE PRACTITIONER

## 2021-03-31 RX ORDER — PHENTERMINE HYDROCHLORIDE 37.5 MG/1
37.5 TABLET ORAL
Qty: 30 TABLET | Refills: 1 | Status: SHIPPED | OUTPATIENT
Start: 2021-03-31 | End: 2021-05-25

## 2021-03-31 NOTE — PROGRESS NOTES
HISTORY OF PRESENT ILLNESS  Patient presents with:  Weight Check: down 7     Torin Dakota Castillo is a 30year old male here for follow up with medical weight loss program for the treatment of overweight, obesity, or morbid obesity.      Down 7 lbs  Compli no rashes, no suspicious lesions  HEENT: conjunctiva pink, sclera non icteric, PERRLA  NECK: supple, no adenopathy  LUNGS: CTA in all fields, breathing non labored  CARDIO: RRR without murmur  GI: +BS, NT/ND, no masses or HSM  EXTREMITIES: no cyanosis, no (Clive Neville) In Vitro Strip, Test before meals; signs/symptoms of hypoglycemia, Disp: 100 strip, Rfl: 2  Blood Glucose Monitoring Suppl ([address]) w/Device Does not apply Kit, Test before meals; signs/symptoms of hypoglycemia, Disp: 1 overcome barriers to successful weight loss and weight maintenance  · FITTE: ACSM recommendations (150-300 minutes/ week in active weight loss)   · Weight Loss Consent to treat reviewed and signed.     There are no Patient Instructions on file for this visi

## 2021-04-01 NOTE — PATIENT INSTRUCTIONS
We are here to support you with weight loss, but please remember that you still need your primary care provider for your routine health maintenance.       PLAN:  Will continue with phentermine and topamax  Follow up with me in 1 month  Schedule follow up ap Daily INPUT> Look at nutrition section-- \"nutrients\" and it will break down your macros for the day (ie. Protein, carbs, fibers, sugars and fats). Try to stay within these numbers daily     2.  \"7 minute workout\" to help with exercise/activity which nieves

## 2021-04-06 VITALS — BODY MASS INDEX: 38 KG/M2 | WEIGHT: 265 LBS

## 2021-04-06 VITALS — WEIGHT: 296 LBS | BODY MASS INDEX: 42 KG/M2

## 2021-05-25 ENCOUNTER — OFFICE VISIT (OUTPATIENT)
Dept: INTERNAL MEDICINE CLINIC | Facility: CLINIC | Age: 36
End: 2021-05-25
Payer: COMMERCIAL

## 2021-05-25 VITALS
HEIGHT: 70 IN | BODY MASS INDEX: 33.64 KG/M2 | HEART RATE: 82 BPM | SYSTOLIC BLOOD PRESSURE: 120 MMHG | DIASTOLIC BLOOD PRESSURE: 70 MMHG | RESPIRATION RATE: 17 BRPM | WEIGHT: 235 LBS

## 2021-05-25 DIAGNOSIS — Z79.4 TYPE 2 DIABETES MELLITUS WITHOUT COMPLICATION, WITH LONG-TERM CURRENT USE OF INSULIN (HCC): ICD-10-CM

## 2021-05-25 DIAGNOSIS — E78.2 MIXED HYPERLIPIDEMIA: ICD-10-CM

## 2021-05-25 DIAGNOSIS — E11.9 TYPE 2 DIABETES MELLITUS WITHOUT COMPLICATION, WITH LONG-TERM CURRENT USE OF INSULIN (HCC): ICD-10-CM

## 2021-05-25 DIAGNOSIS — E55.9 VITAMIN D DEFICIENCY: ICD-10-CM

## 2021-05-25 DIAGNOSIS — Z51.81 THERAPEUTIC DRUG MONITORING: Primary | ICD-10-CM

## 2021-05-25 DIAGNOSIS — E66.9 OBESITY (BMI 30-39.9): ICD-10-CM

## 2021-05-25 PROCEDURE — 99213 OFFICE O/P EST LOW 20 MIN: CPT | Performed by: NURSE PRACTITIONER

## 2021-05-25 PROCEDURE — 3008F BODY MASS INDEX DOCD: CPT | Performed by: NURSE PRACTITIONER

## 2021-05-25 PROCEDURE — 3078F DIAST BP <80 MM HG: CPT | Performed by: NURSE PRACTITIONER

## 2021-05-25 PROCEDURE — 3074F SYST BP LT 130 MM HG: CPT | Performed by: NURSE PRACTITIONER

## 2021-05-25 RX ORDER — TOPIRAMATE 25 MG/1
25 TABLET ORAL 2 TIMES DAILY
Qty: 180 TABLET | Refills: 0 | Status: SHIPPED | OUTPATIENT
Start: 2021-05-25 | End: 2021-09-15

## 2021-05-25 RX ORDER — PHENTERMINE HYDROCHLORIDE 37.5 MG/1
37.5 TABLET ORAL
Qty: 30 TABLET | Refills: 1 | Status: SHIPPED | OUTPATIENT
Start: 2021-05-25 | End: 2021-07-07

## 2021-05-25 NOTE — PATIENT INSTRUCTIONS
We are here to support you with weight loss, but please remember that you still need your primary care provider for your routine health maintenance.       PLAN:  Continue with phentermine and topamax  Follow up with me in 6 weeks  Schedule follow up appoint INPUT> Look at nutrition section-- \"nutrients\" and it will break down your macros for the day (ie. Protein, carbs, fibers, sugars and fats). Try to stay within these numbers daily     2.  \"7 minute workout\" to help with exercise/activity which takes 7 m

## 2021-05-25 NOTE — PROGRESS NOTES
HISTORY OF PRESENT ILLNESS  Patient presents with:  Weight Check: down 14    Torin Tracee Sánchez is a 30year old male here for follow up with medical weight loss program for the treatment of overweight, obesity, or morbid obesity.      Down 14 lbs  Compl breathing non labored  CARDIO: RRR without murmur  GI: +BS, NT/ND, no masses or HSM  EXTREMITIES: no cyanosis, no clubbing, no edema  PSYCH: pleasant, cooperative, normal mood and affect    Lab Results   Component Value Date    GLU 82 12/11/2020    BUN 16 Monitoring Suppl ([address]) w/Device Does not apply Kit, Test before meals; signs/symptoms of hypoglycemia, Disp: 1 kit, Rfl: 0  OneTouch Delica Lancets Fine Does not apply Misc, Test before meals; signs/symptoms of hypoglycemia, Disp: 10 successful weight loss and weight maintenance  · FITTE: ACSM recommendations (150-300 minutes/ week in active weight loss)   Weight Loss Consent to treat reviewed and signed.     Total time spent on chart review, pre-charting, obtaining history, counseling, of calories, protein, carbs                With My Fitness Pal-->When you set-up the mary or need to adjust settings:                Goals should include:                  Lose 1.5-2 lbs per week                Activity level: not very active (can't count ex

## 2021-06-04 PROCEDURE — 3061F NEG MICROALBUMINURIA REV: CPT | Performed by: NURSE PRACTITIONER

## 2021-07-07 ENCOUNTER — OFFICE VISIT (OUTPATIENT)
Dept: INTERNAL MEDICINE CLINIC | Facility: CLINIC | Age: 36
End: 2021-07-07
Payer: COMMERCIAL

## 2021-07-07 VITALS
RESPIRATION RATE: 17 BRPM | DIASTOLIC BLOOD PRESSURE: 70 MMHG | HEIGHT: 70 IN | HEART RATE: 94 BPM | WEIGHT: 232 LBS | BODY MASS INDEX: 33.21 KG/M2 | SYSTOLIC BLOOD PRESSURE: 120 MMHG

## 2021-07-07 DIAGNOSIS — E78.2 MIXED HYPERLIPIDEMIA: ICD-10-CM

## 2021-07-07 DIAGNOSIS — E55.9 VITAMIN D DEFICIENCY: ICD-10-CM

## 2021-07-07 DIAGNOSIS — Z51.81 THERAPEUTIC DRUG MONITORING: Primary | ICD-10-CM

## 2021-07-07 DIAGNOSIS — E66.9 OBESITY (BMI 30-39.9): ICD-10-CM

## 2021-07-07 DIAGNOSIS — Z79.4 TYPE 2 DIABETES MELLITUS WITHOUT COMPLICATION, WITH LONG-TERM CURRENT USE OF INSULIN (HCC): ICD-10-CM

## 2021-07-07 DIAGNOSIS — E11.9 TYPE 2 DIABETES MELLITUS WITHOUT COMPLICATION, WITH LONG-TERM CURRENT USE OF INSULIN (HCC): ICD-10-CM

## 2021-07-07 DIAGNOSIS — E78.5 DYSLIPIDEMIA: ICD-10-CM

## 2021-07-07 PROCEDURE — 3008F BODY MASS INDEX DOCD: CPT | Performed by: NURSE PRACTITIONER

## 2021-07-07 PROCEDURE — 3074F SYST BP LT 130 MM HG: CPT | Performed by: NURSE PRACTITIONER

## 2021-07-07 PROCEDURE — 3078F DIAST BP <80 MM HG: CPT | Performed by: NURSE PRACTITIONER

## 2021-07-07 PROCEDURE — 99213 OFFICE O/P EST LOW 20 MIN: CPT | Performed by: NURSE PRACTITIONER

## 2021-07-07 RX ORDER — PHENTERMINE HYDROCHLORIDE 37.5 MG/1
37.5 TABLET ORAL
Qty: 30 TABLET | Refills: 0 | Status: SHIPPED | OUTPATIENT
Start: 2021-07-20 | End: 2021-09-13

## 2021-07-07 RX ORDER — TOPIRAMATE 25 MG/1
25 TABLET ORAL 2 TIMES DAILY
Qty: 180 TABLET | Refills: 0 | Status: CANCELLED | OUTPATIENT
Start: 2021-07-07

## 2021-07-07 NOTE — PROGRESS NOTES
HISTORY OF PRESENT ILLNESS  Patient presents with:  Weight Check: down 3    Torin Irma May is a 30year old male here for follow up with medical weight loss program for the treatment of overweight, obesity, or morbid obesity.      Down 3 lbs  Complia lesions  HEENT: conjunctiva pink, sclera non icteric, PERRLA  NECK: supple, no adenopathy  LUNGS: CTA in all fields, breathing non labored  CARDIO: RRR without murmur  GI: +BS, NT/ND, no masses or HSM  EXTREMITIES: no cyanosis, no clubbing, no edema  PSYCH VERIO) In Vitro Strip, Test before meals; signs/symptoms of hypoglycemia, Disp: 100 strip, Rfl: 2  Blood Glucose Monitoring Suppl ([address]) w/Device Does not apply Kit, Test before meals; signs/symptoms of hypoglycemia, Disp: 1 kit, Rfl: intake  · Follow up with dietitian and psychologist as recommended. · Discussed the role of sleep and stress in weight management.   · Counseled on comprehensive weight loss plan including attention to nutrition, exercise and behavior/stress management for

## 2021-07-08 NOTE — PATIENT INSTRUCTIONS
We are here to support you with weight loss, but please remember that you still need your primary care provider for your routine health maintenance. PLAN:  Continue with phentermine, stop topamax  Check out GLP-1 medications (ie.  Wegovy (new one- whic Activity level: not very active (can't count exercise towards calorie number per day)                   ** Daily INPUT> Look at nutrition section-- \"nutrients\" and it will break down your macros for the day (ie.  Protein, carbs, fibers, sugars

## 2021-09-08 DIAGNOSIS — Z51.81 THERAPEUTIC DRUG MONITORING: ICD-10-CM

## 2021-09-08 DIAGNOSIS — E66.9 OBESITY (BMI 30-39.9): ICD-10-CM

## 2021-09-08 NOTE — TELEPHONE ENCOUNTER
Requesting Phentermine 37.5 mg  LOV: 7/7/21  RTC: 1-2 months  Last Relevant Labs: na  Filled: 7/20/21 #30 with 0 refills last [place] 8[place]5/21 [place]    Patient is due for a visit and not scheduled. My chart sent.

## 2021-09-10 DIAGNOSIS — E66.9 OBESITY (BMI 30-39.9): ICD-10-CM

## 2021-09-10 DIAGNOSIS — Z51.81 THERAPEUTIC DRUG MONITORING: ICD-10-CM

## 2021-09-10 PROCEDURE — 3044F HG A1C LEVEL LT 7.0%: CPT | Performed by: NURSE PRACTITIONER

## 2021-09-13 RX ORDER — PHENTERMINE HYDROCHLORIDE 37.5 MG/1
TABLET ORAL
Qty: 30 TABLET | Refills: 0 | Status: SHIPPED | OUTPATIENT
Start: 2021-09-13

## 2021-09-13 NOTE — TELEPHONE ENCOUNTER
Requesting   Requested Prescriptions     Pending Prescriptions Disp Refills   • PHENTERMINE HCL 37.5 MG Oral Tab [Pharmacy Med Name: PHENTERMINE 37.5MG TABLET] 30 tablet 0     Sig: TAKE 1 TABLET(37.5 MG) BY MOUTH EVERY MORNING BEFORE BREAKFAST     LOV: 7/7

## 2021-09-15 ENCOUNTER — OFFICE VISIT (OUTPATIENT)
Dept: INTERNAL MEDICINE CLINIC | Facility: CLINIC | Age: 36
End: 2021-09-15
Payer: COMMERCIAL

## 2021-09-15 VITALS
OXYGEN SATURATION: 97 % | BODY MASS INDEX: 33.07 KG/M2 | HEIGHT: 70 IN | SYSTOLIC BLOOD PRESSURE: 110 MMHG | HEART RATE: 98 BPM | RESPIRATION RATE: 18 BRPM | DIASTOLIC BLOOD PRESSURE: 80 MMHG | WEIGHT: 231 LBS

## 2021-09-15 DIAGNOSIS — E66.9 OBESITY (BMI 30-39.9): ICD-10-CM

## 2021-09-15 DIAGNOSIS — E78.5 DYSLIPIDEMIA: ICD-10-CM

## 2021-09-15 DIAGNOSIS — E78.2 MIXED HYPERLIPIDEMIA: ICD-10-CM

## 2021-09-15 DIAGNOSIS — Z51.81 THERAPEUTIC DRUG MONITORING: Primary | ICD-10-CM

## 2021-09-15 DIAGNOSIS — Z79.4 TYPE 2 DIABETES MELLITUS WITHOUT COMPLICATION, WITH LONG-TERM CURRENT USE OF INSULIN (HCC): ICD-10-CM

## 2021-09-15 DIAGNOSIS — E55.9 VITAMIN D DEFICIENCY: ICD-10-CM

## 2021-09-15 DIAGNOSIS — E11.9 TYPE 2 DIABETES MELLITUS WITHOUT COMPLICATION, WITH LONG-TERM CURRENT USE OF INSULIN (HCC): ICD-10-CM

## 2021-09-15 PROCEDURE — 3008F BODY MASS INDEX DOCD: CPT | Performed by: NURSE PRACTITIONER

## 2021-09-15 PROCEDURE — 99214 OFFICE O/P EST MOD 30 MIN: CPT | Performed by: NURSE PRACTITIONER

## 2021-09-15 PROCEDURE — 3074F SYST BP LT 130 MM HG: CPT | Performed by: NURSE PRACTITIONER

## 2021-09-15 PROCEDURE — 3079F DIAST BP 80-89 MM HG: CPT | Performed by: NURSE PRACTITIONER

## 2021-09-15 RX ORDER — SEMAGLUTIDE 1.34 MG/ML
0.5 INJECTION, SOLUTION SUBCUTANEOUS WEEKLY
Qty: 1.5 ML | Refills: 0 | Status: SHIPPED | OUTPATIENT
Start: 2021-09-15

## 2021-09-15 NOTE — PATIENT INSTRUCTIONS
We are here to support you with weight loss, but please remember that you still need your primary care provider for your routine health maintenance.       PLAN:  Will decrease phentermine 15mg (cut the 37.5mg in half)   ozempic 0.25mg X 3 weeks, 0.5mg X 4 w level: not very active (can't count exercise towards calorie number per day)                   ** Daily INPUT> Look at nutrition section-- \"nutrients\" and it will break down your macros for the day (ie. Protein, carbs, fibers, sugars and fats).  Try to st

## 2021-09-15 NOTE — PROGRESS NOTES
HISTORY OF PRESENT ILLNESS  Patient presents with:  Weight Check: down 651 Gonzalez Chan is a 30year old male here for follow up with medical weight loss program for the treatment of overweight, obesity, or morbid obesity.      Down 1 lbs  Compliant on apparent distress, A/O x3  SKIN: no rashes, no suspicious lesions  HEENT: conjunctiva pink, sclera non icteric, PERRLA  NECK: supple, no adenopathy  LUNGS: CTA in all fields, breathing non labored  CARDIO: RRR without murmur  GI: +BS, NT/ND, no masses or H [facility]) w/Device Does not apply Kit, Test before meals; signs/symptoms of hypoglycemia, Disp: 1 kit, Rfl: 0  OneTouch Delica Lancets Fine Does not apply Misc, Test before meals; signs/symptoms of hypoglycemia, Disp: 100 each, Rfl: 2    Dorothy huang nutrition, exercise and behavior/stress management for success. See patient instruction below for more details.   · Discussed strategies to overcome barriers to successful weight loss and weight maintenance  · FITTE: ACSM recommendations (150-300 minutes/ w

## 2021-09-30 RX ORDER — PHENTERMINE HYDROCHLORIDE 37.5 MG/1
37.5 TABLET ORAL
Qty: 30 TABLET | Refills: 0 | OUTPATIENT
Start: 2021-09-30

## 2021-10-10 DIAGNOSIS — E66.9 OBESITY (BMI 30-39.9): ICD-10-CM

## 2021-10-10 DIAGNOSIS — Z51.81 THERAPEUTIC DRUG MONITORING: ICD-10-CM

## 2021-10-11 RX ORDER — TOPIRAMATE 25 MG/1
TABLET ORAL
Qty: 180 TABLET | Refills: 0 | Status: SHIPPED | OUTPATIENT
Start: 2021-10-11

## 2021-10-11 NOTE — TELEPHONE ENCOUNTER
Requesting   Requested Prescriptions     Pending Prescriptions Disp Refills   • TOPIRAMATE 25 MG Oral Tab [Pharmacy Med Name: TOPIRAMATE 25MG TABLETS] 180 tablet 0     Sig: TAKE 1 TABLET(25 MG) BY MOUTH TWICE DAILY     LOV: 9/15/21  RTC:4 weeks  Filled: 5/

## 2021-11-29 ENCOUNTER — HOSPITAL ENCOUNTER (OUTPATIENT)
Age: 36
Discharge: HOME | End: 2021-11-29
Payer: COMMERCIAL

## 2021-11-29 VITALS
WEIGHT: 229 LBS | DIASTOLIC BLOOD PRESSURE: 72 MMHG | BODY MASS INDEX: 32.78 KG/M2 | HEART RATE: 90 BPM | HEIGHT: 70 IN | OXYGEN SATURATION: 98 % | TEMPERATURE: 98 F | RESPIRATION RATE: 16 BRPM | SYSTOLIC BLOOD PRESSURE: 128 MMHG

## 2021-11-29 DIAGNOSIS — J02.9 ACUTE VIRAL PHARYNGITIS: Primary | ICD-10-CM

## 2021-11-29 DIAGNOSIS — J02.9 SORE THROAT: ICD-10-CM

## 2021-11-29 PROCEDURE — 99213 OFFICE O/P EST LOW 20 MIN: CPT | Performed by: PHYSICIAN ASSISTANT

## 2021-11-29 PROCEDURE — U0002 COVID-19 LAB TEST NON-CDC: HCPCS | Performed by: PHYSICIAN ASSISTANT

## 2021-11-29 PROCEDURE — 87880 STREP A ASSAY W/OPTIC: CPT | Performed by: PHYSICIAN ASSISTANT

## 2021-11-29 NOTE — ED PROVIDER NOTES
Patient Seen in: [facility]mediate [address] [place] [address]      History   Patient presents with:  Sore Throat: Entered by patient    Stated Complaint: Sore Throat    Subjective:   HPI    27-year-old male who comes in today complaining of sore throat, headache and or drooling no phonation changes, patient handling secretions well   Neck: Supple; no anterior or posterior cervical adenopathy, no neck rigidity or meningeal signs  Lungs: Clear to auscultation bilaterally, respirations unlabored.  No wheezing, rales or rh

## 2022-09-20 ENCOUNTER — OFFICE VISIT (OUTPATIENT)
Dept: INTERNAL MEDICINE CLINIC | Facility: CLINIC | Age: 37
End: 2022-09-20

## 2022-09-20 VITALS
DIASTOLIC BLOOD PRESSURE: 80 MMHG | HEART RATE: 104 BPM | BODY MASS INDEX: 39.8 KG/M2 | WEIGHT: 278 LBS | HEIGHT: 70 IN | SYSTOLIC BLOOD PRESSURE: 122 MMHG | OXYGEN SATURATION: 99 % | RESPIRATION RATE: 16 BRPM

## 2022-09-20 DIAGNOSIS — E78.2 MIXED HYPERLIPIDEMIA: ICD-10-CM

## 2022-09-20 DIAGNOSIS — E55.9 VITAMIN D DEFICIENCY: ICD-10-CM

## 2022-09-20 DIAGNOSIS — Z79.4 TYPE 2 DIABETES MELLITUS WITHOUT COMPLICATION, WITH LONG-TERM CURRENT USE OF INSULIN (HCC): ICD-10-CM

## 2022-09-20 DIAGNOSIS — Z51.81 THERAPEUTIC DRUG MONITORING: Primary | ICD-10-CM

## 2022-09-20 DIAGNOSIS — E66.9 OBESITY (BMI 30-39.9): ICD-10-CM

## 2022-09-20 DIAGNOSIS — E11.9 TYPE 2 DIABETES MELLITUS WITHOUT COMPLICATION, WITH LONG-TERM CURRENT USE OF INSULIN (HCC): ICD-10-CM

## 2022-09-20 PROCEDURE — 99214 OFFICE O/P EST MOD 30 MIN: CPT | Performed by: NURSE PRACTITIONER

## 2022-09-20 PROCEDURE — 3074F SYST BP LT 130 MM HG: CPT | Performed by: NURSE PRACTITIONER

## 2022-09-20 PROCEDURE — 3008F BODY MASS INDEX DOCD: CPT | Performed by: NURSE PRACTITIONER

## 2022-09-20 PROCEDURE — 3079F DIAST BP 80-89 MM HG: CPT | Performed by: NURSE PRACTITIONER

## 2022-09-20 RX ORDER — PHENTERMINE HYDROCHLORIDE 37.5 MG/1
37.5 TABLET ORAL
Qty: 30 TABLET | Refills: 1 | Status: SHIPPED | OUTPATIENT
Start: 2022-09-20

## 2022-09-20 NOTE — PATIENT INSTRUCTIONS
We are here to support you with weight loss, but please remember that you still need your primary care provider for your routine health maintenance. PLAN:  Will restart phentermine and jardance 10mg daily    Follow up with me in 6-8 weeks  Schedule follow up appointments: Zoran Montenegro (dietitian) or Jaye Mckeon (presurgery dietitian)   Check for insurance coverage for dietitian and labwork prior to scheduling appointment. Please try to work on the following dietary changes:  1. Goals: Aim for 20-30 grams of protein/ meal  i. Aim for 170 grams of carbohydrates/day  ii. Eat 4-6 vegetables/day  iii. Avoid skipping meals- eat every 4-5 hours  iv. Aim for 3 meals/day  2. Drink lots of water and cut down on soda/juice consumption if soda/juice drinker  3. Focus on protein: (15-30 grams with each meal) ie. greek yogurt, cottage cheese, string cheese, hard boiled eggs  4. Healthy snacks: peanut butter and apples, hummus and carrots, berries, nuts (1/4 cup), tuna and crackers                 Protein Shakes: Premier protein or Core Power                Protein Bars: Rx Bars, Oatmega, Power Crunch                 Sargento balanced breaks (cheese and nuts)- without chocolate  5. Reduce carbohydrates which includes sweets as well as rice, pasta, potatoes, bread, corn and instead choose whole grain options or more protein or vegetables (4-6 servings of vegetables per day)  6. Get a good night of sleep  7. Try to decrease stress in life     Please download apps:  1. \"My Fitness Pal\" (other option is Lose it)) to help you to monitor daily dietary intake and you will be able to see if you are eating the right amount of calories, protein, carbs                With My Fitness Pal-->When you set-up the mary or need to adjust settings:                Goals should include:                  Lose 1.5-2 lbs per week                Activity level: not very active (can't count exercise towards calorie number per day) ** Daily INPUT> Look at nutrition section-- \"nutrients\" and it will break down your macros for the day (ie. Protein, carbs, fibers, sugars and fats). Try to stay within these numbers daily     2. \"7 minute workout\" to help with exercise/activity which takes 7 minutes of your day and that you can do at home! 3. \"Calm\" or \"Headspace\" which helps with mindfulness, meditation, clarity, sleep, and alissa to your daily life. 4. Wedia blog for healthy recipe ideas  5. Three Screen Games for low carb resources    HIGH PROTEIN SNACK IDEAS  -cottage cheese  -plain yogurt  -kefir  -hard-boiled eggs  -natural cheeses  -nuts (measure portion size)   -unsweetened nut butters  -dried edamame   -josefa seeds soaked in water or almond milk  -soy nuts  -cured meats (monitor for sodium issues)   -hummus with vegetables  -bean dip with vegetables     FRUIT  Low carb fruit options   Raspberries: Half a cup (60 grams) contains 3 grams of carbs. Blackberries: Half a cup (70 grams) contains 4 grams of carbs. Strawberries: Half a cup (100 grams) contains 6 grams of carbs. Blueberries: Half a cup (50 grams) contains 6 grams of carbs. Plum: One medium-sized (80 grams) contains 6 grams of carbs.      VEGETABLES  Low carb vegetables

## 2022-11-07 ENCOUNTER — OFFICE VISIT (OUTPATIENT)
Dept: INTERNAL MEDICINE CLINIC | Facility: CLINIC | Age: 37
End: 2022-11-07
Payer: COMMERCIAL

## 2022-11-07 VITALS
WEIGHT: 278 LBS | DIASTOLIC BLOOD PRESSURE: 84 MMHG | SYSTOLIC BLOOD PRESSURE: 126 MMHG | HEART RATE: 90 BPM | HEIGHT: 70 IN | BODY MASS INDEX: 39.8 KG/M2 | RESPIRATION RATE: 16 BRPM

## 2022-11-07 DIAGNOSIS — Z79.4 TYPE 2 DIABETES MELLITUS WITHOUT COMPLICATION, WITH LONG-TERM CURRENT USE OF INSULIN (HCC): ICD-10-CM

## 2022-11-07 DIAGNOSIS — Z51.81 THERAPEUTIC DRUG MONITORING: Primary | ICD-10-CM

## 2022-11-07 DIAGNOSIS — E78.2 MIXED HYPERLIPIDEMIA: ICD-10-CM

## 2022-11-07 DIAGNOSIS — E11.9 TYPE 2 DIABETES MELLITUS WITHOUT COMPLICATION, WITH LONG-TERM CURRENT USE OF INSULIN (HCC): ICD-10-CM

## 2022-11-07 DIAGNOSIS — E66.9 OBESITY (BMI 30-39.9): ICD-10-CM

## 2022-11-07 DIAGNOSIS — E55.9 VITAMIN D DEFICIENCY: ICD-10-CM

## 2022-11-07 PROCEDURE — 3008F BODY MASS INDEX DOCD: CPT | Performed by: NURSE PRACTITIONER

## 2022-11-07 PROCEDURE — 3074F SYST BP LT 130 MM HG: CPT | Performed by: NURSE PRACTITIONER

## 2022-11-07 PROCEDURE — 3079F DIAST BP 80-89 MM HG: CPT | Performed by: NURSE PRACTITIONER

## 2022-11-07 PROCEDURE — 99213 OFFICE O/P EST LOW 20 MIN: CPT | Performed by: NURSE PRACTITIONER

## 2022-11-07 RX ORDER — PHENTERMINE HYDROCHLORIDE 37.5 MG/1
37.5 TABLET ORAL
Qty: 30 TABLET | Refills: 1 | Status: CANCELLED
Start: 2022-11-07

## 2022-11-07 NOTE — PATIENT INSTRUCTIONS
Next steps:  1. Fill your prescribed medication and take as discussed and prescribed: phentermine and jardance   2. Schedule a personal nutrition consultation with one of our registered dieticians     Please try to work on the following dietary changes:  1. Drink water with meals and throughout the day, cut down on soda and/or juice if consumed. Consider flavored water options like Bubbly, Spindrift, Hint and Fabian. 2.  Eat breakfast daily and focus on having protein with each meal, examples include: greek yogurt, cottage cheese, hard boiled egg, whole grain toast with peanut butter. 3.  Reduce refined carbohydrates and sugars which includes items such as sweets, as well as rice, pasta, and bread and make sure to choose whole grain options when having them with just 1 serving per meal about the size of your inner palm. 4.  Consume non starchy veggies daily working towards making them a good 50% of your daily food intake. Add them to lunch and dinner consistently. 5.  Start a daily probiotic: VSL#3 is recommended, (order on line at www.vsl3. com). Take 1 capsule daily with water for 30 days, then reduce to 1 every other day (this will reduce the cost). Capsules can be left out for 2 weeks, but then must be refrigerated. Please download mary My Fitness Ramone BioKier! Or Net Diary to monitor daily dietary intake and you will be able to see if you are eating the right amount of calories or too much or too little which would hinder weight loss. Additionally this will help to see your daily carbohydrate and protein intake. When you set the mary up choose 1-2 lbs/week as a goal.  Keeping a paper food journal is an option as well to remain accountable for your choices- this is the start to mindful eating! A low calorie diet has been consistently shown to support weight loss. Continue or start exercising to help establish a routine.  If not already exercising begin with 1 day and progress as able with long-term goal of 30 minutes 5 days a week at a minimum. Meditation daily can help manage and control stress. Chronic stress can make weight loss difficult. Exercising is one way to help with stress, but meditation using the CALM George or another comparable alternative can be done in your home or place of work with little time commitment. This George can also help work on behavior change and improve sleep. Check out the segment under Calm Masterclass and listen to The 4 Pillars of Health. A great way to begin learning about the foundation of lifestyle with practical tips to use in your every day. Check out www.yourweightmatters. org blog for continued daily support and education along this weight loss journey! Patient Resources:     Personal Training/Fitness Classes/Health Coaching     Jo Greco and Taveras Sophiaside @ [email or web address] Full fitness center with group fitness and personal training. Discount available as client of [facility]. Health Coaching and Personal Training with Justine Aguilar at our [facility]- individual weekly coaching with option to add personal training and small group fitness classes targeted at weight loss- [phone] and/or email @ [email or web address]  360FIT [place] [email or web address] Group Fitness [phone] and/or email Bonny Celis at [email or web address]  [address] [facility] with multiple locations: Aetna (www.W[address] Drive Bio), Eat The Nexthink Fitness (www.Rupeetalk. Decision Rocket), Fit Body Bootcamp (www.Onward Behavioral Healthbodybootcamp.Decision Rocket), Raidarrr Fitness (www.Altobridge. Decision Rocket), The Exercise  (www.exercisecoach.Decision Rocket)     Online Fitness  Fitness  on Whole Foods in 10 DVD series- www. jdqmp88MDC. Decision Rocket  Sit and Be Fit - Chair exercise series Www.sitandbefit. org  Hip Hop Fit with Brown Laurent at www.hiphopfit. net     Apps for on the [employer] 7 Minute Workout (orange box with white 7) - free on the go HIIT training george  Peloton George @ www. TrenDemon     Nutrition Trackers and Tools  LoseIT! And My Fitness Pal apps and on line for tracking nutrition  NOOM - virtual health coaching  FitFoundation (healthy meals on the go) in [facility] @ www. mpontgmouhkpo0d. Carmen Killian MD @ www.bistromdFlareo and Alejandra Lau (keto and low carb plans recommended) @ www. [email or web address].VUS, Metabolic Meals @ www. GRIDiant CorporationMetabolicMeals. com - individual prepared meals to go  iHigh, EnzymeRx, International Business Machines, Every Plate, GoMoto- on line meal delivery programs for preparation at home  AK Safer Minicabs in [place] for homemade meals to go @ wwwDivided. zweitgeist  Diet Doctor @ www. dietdoctor. zweitgeist - low carb swaps  Ancera - meal prep and planning george (www.yummly. com)     Stress Management/Behavior/Mindful Eating  CALM meditation george (www.BumpTop)  Headspace  Am I Hungry? Mindful eating virtual  george  Www.yourweightmatters. org - Obesity Action Coalition sponsored Blog posts daily  Motivation george (black box with white \")- daily supportive messages sent to your phone     Books/Video Education/Podcasts  Mindless Eating by Marilynn Cockayne  Why We Get Sick by Maryjane Rogers (a book about insulin resistance)  Atomic Habits by Aamir Ask (a book about taking small steps to promote greater behavior change)   Can't Hurt Me by Jolanta Hall (a book exploring the power of discipline in achieving your goals)  The End of Dieting: How to Live for Life by Dr. Melvin Claros M.D. or listen to The [address] [facility] [address] Episode 61: Understanding \"Nutritarian\" Eating w/Dr. Melvin Claros  Your Body in Balance: The World [employer] Services Corporation of Food, Hormones, and Health by Dr. Layla Wheeler  The Menopause Diet Plan by Keyla Haywood and [facility] AT [facility]  The Complete Guide to fasting by Dr. Adam Moreno, [address] [facility] by Tim Morfin, Ph.D, R.D.   Weight Loss Surgery Will Not Treat Food Addiction by Sherman Crisostomo Ph.D  The Game Changers- Tana Documentary on plant based nutrition  Fed Up - documentary about obesity (Free on Utube)  The Truth About Sugar - documentary on sugar (Free on Utube, https://youtu. be/9X4klbyEC8k)  The Dr. Maynor Pelayo by Dr. Sunni Tam MD  Fitlosophy Fitspiration - journal to better health (found at [employer] in fitness aisle)  What Happened to You?- a look at the impact trauma has on behavior written by Lukas Waggoner and Dr. Mendez Rolling Again by Jessica Swenson - discovering your true self after trauma  Domenico White talk on Fare Motion, The Call to Courage  Podcasts: The Exam Room by the Physician's Committee, Nutrition Facts by Dr. Demetrius Arenas    We are here to support you with weight loss, but please remember that you still need your primary care provider for your routine health maintenance.

## 2022-11-22 ENCOUNTER — PATIENT MESSAGE (OUTPATIENT)
Dept: INTERNAL MEDICINE CLINIC | Facility: CLINIC | Age: 37
End: 2022-11-22

## 2022-11-23 ENCOUNTER — PATIENT MESSAGE (OUTPATIENT)
Dept: INTERNAL MEDICINE CLINIC | Facility: CLINIC | Age: 37
End: 2022-11-23

## 2022-11-25 RX ORDER — DIETHYLPROPION HYDROCHLORIDE 75 MG/1
1 TABLET ORAL EVERY MORNING
Qty: 30 TABLET | Refills: 0 | Status: SHIPPED | OUTPATIENT
Start: 2022-11-25 | End: 2022-12-25

## 2023-01-12 RX ORDER — DIETHYLPROPION HYDROCHLORIDE 75 MG/1
TABLET ORAL
Qty: 30 TABLET | Refills: 2 | Status: SHIPPED | OUTPATIENT
Start: 2023-01-12

## 2023-01-12 NOTE — TELEPHONE ENCOUNTER
Requesting diethylpropion ER 75 mg  LOV: 11/7/22  RTC: 7 weeks  Last Relevant Labs: na  Filled: 11/25/22 #30 with 0 refills last [place] 11[place]26/22 [place]    Future Appointments   Date Time Provider Cristina Franks   1/23/2023  3:40 PM Sg Bermeo, JANICE EMGWEI [facility]

## 2023-01-23 ENCOUNTER — OFFICE VISIT (OUTPATIENT)
Dept: INTERNAL MEDICINE CLINIC | Facility: CLINIC | Age: 38
End: 2023-01-23
Payer: COMMERCIAL

## 2023-01-23 VITALS
BODY MASS INDEX: 40.8 KG/M2 | OXYGEN SATURATION: 99 % | HEIGHT: 70 IN | SYSTOLIC BLOOD PRESSURE: 120 MMHG | HEART RATE: 106 BPM | DIASTOLIC BLOOD PRESSURE: 84 MMHG | RESPIRATION RATE: 18 BRPM | WEIGHT: 285 LBS

## 2023-01-23 DIAGNOSIS — E66.9 OBESITY (BMI 30-39.9): ICD-10-CM

## 2023-01-23 DIAGNOSIS — E55.9 VITAMIN D DEFICIENCY: ICD-10-CM

## 2023-01-23 DIAGNOSIS — E78.2 MIXED HYPERLIPIDEMIA: ICD-10-CM

## 2023-01-23 DIAGNOSIS — E11.9 TYPE 2 DIABETES MELLITUS WITHOUT COMPLICATION, WITH LONG-TERM CURRENT USE OF INSULIN (HCC): ICD-10-CM

## 2023-01-23 DIAGNOSIS — Z51.81 THERAPEUTIC DRUG MONITORING: Primary | ICD-10-CM

## 2023-01-23 DIAGNOSIS — Z79.4 TYPE 2 DIABETES MELLITUS WITHOUT COMPLICATION, WITH LONG-TERM CURRENT USE OF INSULIN (HCC): ICD-10-CM

## 2023-01-23 PROCEDURE — 3074F SYST BP LT 130 MM HG: CPT | Performed by: NURSE PRACTITIONER

## 2023-01-23 PROCEDURE — 99214 OFFICE O/P EST MOD 30 MIN: CPT | Performed by: NURSE PRACTITIONER

## 2023-01-23 PROCEDURE — 3008F BODY MASS INDEX DOCD: CPT | Performed by: NURSE PRACTITIONER

## 2023-01-23 PROCEDURE — 3079F DIAST BP 80-89 MM HG: CPT | Performed by: NURSE PRACTITIONER

## 2023-01-23 RX ORDER — PHENTERMINE HYDROCHLORIDE 37.5 MG/1
37.5 TABLET ORAL
Qty: 90 TABLET | Refills: 0 | Status: SHIPPED | OUTPATIENT
Start: 2023-01-23

## 2023-01-23 RX ORDER — ORAL SEMAGLUTIDE 3 MG/1
TABLET ORAL
COMMUNITY

## 2023-01-23 NOTE — PATIENT INSTRUCTIONS
Next steps:  1. Fill your prescribed medication and take as discussed and prescribed:   Stop diethylpropion  Will restart phentermine  Will trial rybelsus 3mg daily (make sure to take on an empty stomach and wait 30 minutes before eating or drinking anything)   Send in Phantom message in 2-3 weeks to see if you want to increase the next dose 7mg   2. Schedule a personal nutrition consultation with one of our registered dieticians     Please try to work on the following dietary changes:  Daily protein recommendation to start:  grams  Daily carbohydrate: <170  Daily calories: 2,100  1. Drink water with meals and throughout the day, cut down on soda and/or juice if consumed. Consider flavored water options like Bubbly, Spindrift, Hint and Fabian. 2.  Eat breakfast daily and focus on having protein with each meal, examples include: greek yogurt, cottage cheese, hard boiled egg, whole grain toast with peanut butter. 3.  Reduce refined carbohydrates and sugars which includes items such as sweets, as well as rice, pasta, and bread and make sure to choose whole grain options when having them with just 1 serving per meal about the size of your inner palm. 4.  Consume non starchy veggies daily working towards making them a good 50% of your daily food intake. Add them to lunch and dinner consistently. 5.  Start a daily probiotic: VSL#3 is recommended, (order on line at www.vsl3. com). Take 1 capsule daily with water for 30 days, then reduce to 1 every other day (this will reduce the cost). Capsules can be left out for 2 weeks, but then must be refrigerated. Please download mary My Fitness Arcelia Cheek! Or Net Diary to monitor daily dietary intake and you will be able to see if you are eating the right amount of calories or too much or too little which would hinder weight loss. Additionally this will help to see your daily carbohydrate and protein intake.  When you set the mary up choose 1-2 lbs/week as a goal. Keeping a paper food journal is an option as well to remain accountable for your choices- this is the start to mindful eating! A low calorie diet has been consistently shown to support weight loss. Continue or start exercising to help establish a routine. If not already exercising begin with 1 day and progress as able with long-term goal of 30 minutes 5 days a week at a minimum. Meditation daily can help manage and control stress. Chronic stress can make weight loss difficult. Exercising is one way to help with stress, but meditation using the CALM George or another comparable alternative can be done in your home or place of work with little time commitment. This George can also help work on behavior change and improve sleep. Check out the segment under Calm Masterclass and listen to The 4 Pillars of Health. A great way to begin learning about the foundation of lifestyle with practical tips to use in your every day. Check out www.yourweightmatters. org blog for continued daily support and education along this weight loss journey! Patient Resources:     Personal Training/Fitness Classes/Health Coaching     Jo Greco and Taveras Sophiaside @ [email or web address] Full fitness center with group fitness and personal training. Discount available as client of [facility]. Health Coaching and Personal Training with Gambian Golden at our [facility]- individual weekly coaching with option to add personal training and small group fitness classes targeted at weight loss- [phone] and/or email @ [email or web address]  360FIT [place] [email or web address] Group Fitness [phone] and/or email Nay Anderson at [email or web address]  [address] with multiple locations: Aetna (www.Broadcast.com. JotSpot), Eat The Frog Fitness (www.Clever Cloud. JotSpot), Fit Body Bootcamp (www.RenovoRxcamp.JotSpot), Delta Life Fitness (www.Interview. BMP Sunstone Corporation), The Exercise  (www.exercisecoach.BMP Sunstone Corporation)     Online Fitness  Fitness  on Whole Foods in 10 DVD series- www. lxivm05OQG. BMP Sunstone Corporation  Sit and Be Fit - Chair exercise series Www.sitandbefit. org  Hip Hop Fit with Brown Laurent at www.hiphopfit. net     Apps for on the Inktd 7 Minute Workout (orange box with white 7) - free on the go HIIT training george  Peloton George @ The Fred Rogers     Nutrition Trackers and Tools  LoseIT! And My Fitness Pal apps and on line for tracking nutrition  NOOM - virtual health coaching  FitFoundation (healthy meals on the go) in [employer]SCI @ wwwBioservo Technologies tmpkwforfyvgx8a. Shahab Euceda MD @ wwwReGenX Biosciences and Dock Starch (keto and low carb plans recommended) @ www. ZASFMS85.RFR, Metabolic Meals @ www. tocarioals. com - individual prepared meals to go  Picotek INC, Hudgeons & Temple, International Business Machines, Every Plate, Easyworks Universe- on line meal delivery programs for preparation at home  AK OfferLounge in [facility] for homemade meals to go @ wwwAvuxi. BMP Sunstone Corporation  Diet Doctor @ www. dietdoctor. BMP Sunstone Corporation - low carb swaps  Yummly - meal prep and planning george (www.yummly. com)     Stress Management/Behavior/Mindful Eating  CALM meditation george (www.calm. BMP Sunstone Corporation)  Headspace  Am I Hungry? Mindful eating virtual  george  Www.yourweightmatters. org - Obesity Action Coalition sponsored Blog posts daily  Motivation george (black box with white \")- daily supportive messages sent to your phone     Books/Video Education/Podcasts  Mindless Eating by Linda Eduardo  Why We Get Sick by Natanael Lyons (a book about insulin resistance)  Atomic Habits by Lorraine Underwood (a book about taking small steps to promote greater behavior change)   Can't Hurt Me by Irena Remedies (a book exploring the power of discipline in achieving your goals)  The End of Dieting: How to Live for Life by Dr. Luis Orozco M.D. or listen to The [address] [facility] Episode 61:  Understanding \"Nutritarian\" Eating w/Dr. Smith Tryon  Your Body in Balance: The World Fuel Services Corporation of Food, Hormones, and Health by Dr. Shayy Birch  The Menopause Diet Plan by Almaz Murillo and [facility] AT [facility]  The Complete Guide to fasting by Dr. Preet Villalba, [address] [facility] by Guille Stapleton, Ph.D, R.D. Weight Loss Surgery Will Not Treat Food Addiction by Stephanie Gold Ph.D  The [address] [place] [address] on plant based nutrition  Fed Up - documentary about obesity (Free on [place])  The Truth About Sugar - documentary on sugar (Free on Synthonics, https://youQterosu. be/1Z4fnngSM1u)  The Dr. Cooper Nail by Dr. Crow Bowen MD  Fitlosophy Fitspiration - journal to better health (found at [employer] in fitness aisle)  What Happened to You?- a look at the impact trauma has on behavior written by Charla Schroeder and Dr. Danielle Frost Again by Ilsa Grajeda - discovering your true self after trauma  Jason Painter talk on Everyone Counts, The Call to Courage  Podcasts: The Exam Room by the Physician's Committee, Nutrition Facts by Dr. Lonell Claude    We are here to support you with weight loss, but please remember that you still need your primary care provider for your routine health maintenance.

## 2023-02-24 ENCOUNTER — LAB ENCOUNTER (OUTPATIENT)
Dept: LAB | Age: 38
End: 2023-02-24
Attending: NURSE PRACTITIONER
Payer: COMMERCIAL

## 2023-02-24 ENCOUNTER — PATIENT MESSAGE (OUTPATIENT)
Dept: INTERNAL MEDICINE CLINIC | Facility: CLINIC | Age: 38
End: 2023-02-24

## 2023-02-24 DIAGNOSIS — Z51.81 THERAPEUTIC DRUG MONITORING: ICD-10-CM

## 2023-02-24 DIAGNOSIS — E66.9 OBESITY (BMI 30-39.9): ICD-10-CM

## 2023-02-24 LAB
ALBUMIN SERPL-MCNC: 4 G/DL (ref 3.4–5)
ALBUMIN/GLOB SERPL: 1 {RATIO} (ref 1–2)
ALP LIVER SERPL-CCNC: 42 U/L
ALT SERPL-CCNC: 37 U/L
ANION GAP SERPL CALC-SCNC: 5 MMOL/L (ref 0–18)
AST SERPL-CCNC: 22 U/L (ref 15–37)
BASOPHILS # BLD AUTO: 0.02 X10(3) UL (ref 0–0.2)
BASOPHILS NFR BLD AUTO: 0.5 %
BILIRUB SERPL-MCNC: 0.6 MG/DL (ref 0.1–2)
BUN BLD-MCNC: 16 MG/DL (ref 7–18)
CALCIUM BLD-MCNC: 9.2 MG/DL (ref 8.5–10.1)
CHLORIDE SERPL-SCNC: 106 MMOL/L (ref 98–112)
CHOLEST SERPL-MCNC: 211 MG/DL (ref ?–200)
CO2 SERPL-SCNC: 27 MMOL/L (ref 21–32)
CREAT BLD-MCNC: 0.95 MG/DL
EOSINOPHIL # BLD AUTO: 0.12 X10(3) UL (ref 0–0.7)
EOSINOPHIL NFR BLD AUTO: 2.7 %
ERYTHROCYTE [DISTWIDTH] IN BLOOD BY AUTOMATED COUNT: 12.3 %
EST. AVERAGE GLUCOSE BLD GHB EST-MCNC: 117 MG/DL (ref 68–126)
FASTING PATIENT LIPID ANSWER: YES
FASTING STATUS PATIENT QL REPORTED: YES
GFR SERPLBLD BASED ON 1.73 SQ M-ARVRAT: 106 ML/MIN/1.73M2 (ref 60–?)
GLOBULIN PLAS-MCNC: 4.1 G/DL (ref 2.8–4.4)
GLUCOSE BLD-MCNC: 98 MG/DL (ref 70–99)
HBA1C MFR BLD: 5.7 % (ref ?–5.7)
HCT VFR BLD AUTO: 45.2 %
HDLC SERPL-MCNC: 34 MG/DL (ref 40–59)
HGB BLD-MCNC: 15.1 G/DL
IMM GRANULOCYTES # BLD AUTO: 0.01 X10(3) UL (ref 0–1)
IMM GRANULOCYTES NFR BLD: 0.2 %
LDLC SERPL CALC-MCNC: 161 MG/DL (ref ?–100)
LYMPHOCYTES # BLD AUTO: 2.19 X10(3) UL (ref 1–4)
LYMPHOCYTES NFR BLD AUTO: 49.8 %
MCH RBC QN AUTO: 29.5 PG (ref 26–34)
MCHC RBC AUTO-ENTMCNC: 33.4 G/DL (ref 31–37)
MCV RBC AUTO: 88.5 FL
MONOCYTES # BLD AUTO: 0.41 X10(3) UL (ref 0.1–1)
MONOCYTES NFR BLD AUTO: 9.3 %
NEUTROPHILS # BLD AUTO: 1.65 X10 (3) UL (ref 1.5–7.7)
NEUTROPHILS # BLD AUTO: 1.65 X10(3) UL (ref 1.5–7.7)
NEUTROPHILS NFR BLD AUTO: 37.5 %
NONHDLC SERPL-MCNC: 177 MG/DL (ref ?–130)
OSMOLALITY SERPL CALC.SUM OF ELEC: 287 MOSM/KG (ref 275–295)
PLATELET # BLD AUTO: 214 10(3)UL (ref 150–450)
POTASSIUM SERPL-SCNC: 3.7 MMOL/L (ref 3.5–5.1)
PROT SERPL-MCNC: 8.1 G/DL (ref 6.4–8.2)
RBC # BLD AUTO: 5.11 X10(6)UL
SODIUM SERPL-SCNC: 138 MMOL/L (ref 136–145)
T4 FREE SERPL-MCNC: 1.1 NG/DL (ref 0.8–1.7)
TRIGL SERPL-MCNC: 88 MG/DL (ref 30–149)
TSI SER-ACNC: 1.61 MIU/ML (ref 0.36–3.74)
VIT B12 SERPL-MCNC: 422 PG/ML (ref 193–986)
VIT D+METAB SERPL-MCNC: 10.2 NG/ML (ref 30–100)
VLDLC SERPL CALC-MCNC: 17 MG/DL (ref 0–30)
WBC # BLD AUTO: 4.4 X10(3) UL (ref 4–11)

## 2023-02-24 PROCEDURE — 80053 COMPREHEN METABOLIC PANEL: CPT

## 2023-02-24 PROCEDURE — 36415 COLL VENOUS BLD VENIPUNCTURE: CPT

## 2023-02-24 PROCEDURE — 80061 LIPID PANEL: CPT

## 2023-02-24 PROCEDURE — 3044F HG A1C LEVEL LT 7.0%: CPT | Performed by: PHYSICIAN ASSISTANT

## 2023-02-24 PROCEDURE — 83036 HEMOGLOBIN GLYCOSYLATED A1C: CPT

## 2023-02-24 PROCEDURE — 85025 COMPLETE CBC W/AUTO DIFF WBC: CPT

## 2023-02-24 PROCEDURE — 84439 ASSAY OF FREE THYROXINE: CPT

## 2023-02-24 PROCEDURE — 84443 ASSAY THYROID STIM HORMONE: CPT

## 2023-02-24 PROCEDURE — 82607 VITAMIN B-12: CPT

## 2023-02-24 PROCEDURE — 82306 VITAMIN D 25 HYDROXY: CPT

## 2023-02-24 RX ORDER — ORAL SEMAGLUTIDE 3 MG/1
TABLET ORAL
Qty: 30 TABLET | Refills: 0 | Status: CANCELLED | OUTPATIENT
Start: 2023-02-24

## 2023-02-24 NOTE — TELEPHONE ENCOUNTER
From: Faina Medrano  To: JANICE Aguilera  Sent: 2/24/2023 2:27 PM CST  Subject: Lab Results    Hi Latosha Medina-    I completed my labs today, so would we be able to formally submit the prescription for Rybelsus? I checked with my insurance provider and they confirmed that both Rybelsus and Leann Nine are covered on my plan with relatively low out of pocket expenses as long as the prescription request notes that the patient is diabetic.     Thanks,  Seun Garcia

## 2023-02-27 NOTE — PROGRESS NOTES
Elevated blood sugar (a1c) it is in the prediabetes range: 5.7%- I recommend avoiding carbs, exercise, and possibly starting a medication, which will help with weight loss, prediabetes. Very low Vitamin d:  please start taking ergocalciferol 50,000 U 2 times per week X16 weeks (please order #32 no refill).  Then start daily maintenance vitamin D 2000 Units  Vitamin B12 stable  Thyroid:  stable  Cholesterol: (elevated total, low HDL, elevated LDL) recommend exercise, weight loss, increasing fiber, fish and nuts

## 2023-03-03 ENCOUNTER — TELEPHONE (OUTPATIENT)
Dept: INTERNAL MEDICINE CLINIC | Facility: CLINIC | Age: 38
End: 2023-03-03

## 2023-03-03 NOTE — TELEPHONE ENCOUNTER
PA needed for Wegovy 0.25 mg  [facility]  ID [id number]    Form completed and faxed back   Obesity E66.9,  DM  E11.9,  hyperlipid  E78.2    Tried phentermine and diethylpropion and topamax in past  Fax to [phone] with last note

## 2023-03-05 ENCOUNTER — HOSPITAL ENCOUNTER (OUTPATIENT)
Age: 38
Discharge: HOME | End: 2023-03-05
Payer: COMMERCIAL

## 2023-03-05 VITALS
DIASTOLIC BLOOD PRESSURE: 89 MMHG | SYSTOLIC BLOOD PRESSURE: 150 MMHG | OXYGEN SATURATION: 100 % | HEIGHT: 70 IN | TEMPERATURE: 98 F | WEIGHT: 265 LBS | RESPIRATION RATE: 20 BRPM | BODY MASS INDEX: 37.94 KG/M2 | HEART RATE: 105 BPM

## 2023-03-05 DIAGNOSIS — B27.90 INFECTIOUS MONONUCLEOSIS WITHOUT COMPLICATION, INFECTIOUS MONONUCLEOSIS DUE TO UNSPECIFIED ORGANISM: ICD-10-CM

## 2023-03-05 DIAGNOSIS — J03.80 ACUTE VIRAL TONSILLITIS: Primary | ICD-10-CM

## 2023-03-05 DIAGNOSIS — B97.89 ACUTE VIRAL TONSILLITIS: Primary | ICD-10-CM

## 2023-03-05 LAB
POCT MONO: POSITIVE
S PYO AG THROAT QL: NEGATIVE
SARS-COV-2 RNA RESP QL NAA+PROBE: NOT DETECTED

## 2023-03-05 PROCEDURE — 86308 HETEROPHILE ANTIBODY SCREEN: CPT | Performed by: NURSE PRACTITIONER

## 2023-03-05 PROCEDURE — 87880 STREP A ASSAY W/OPTIC: CPT | Performed by: NURSE PRACTITIONER

## 2023-03-05 PROCEDURE — U0002 COVID-19 LAB TEST NON-CDC: HCPCS | Performed by: NURSE PRACTITIONER

## 2023-03-05 PROCEDURE — 99213 OFFICE O/P EST LOW 20 MIN: CPT | Performed by: NURSE PRACTITIONER

## 2023-03-05 RX ORDER — DEXAMETHASONE 4 MG/1
12 TABLET ORAL ONCE
Status: COMPLETED | OUTPATIENT
Start: 2023-03-05 | End: 2023-03-05

## 2023-03-05 NOTE — DISCHARGE INSTRUCTIONS
Continue with salt water gargles. Avoid any contact sports until you follow-up with primary care. Do not share drinks with other people, mono can be contagious through saliva.

## 2023-03-05 NOTE — ED INITIAL ASSESSMENT (HPI)
Pt has had a sore throat for about 1 week , and last night developed swollen glands and exudate with a worsening sore throat

## 2023-03-07 ENCOUNTER — OFFICE VISIT (OUTPATIENT)
Dept: INTERNAL MEDICINE CLINIC | Facility: CLINIC | Age: 38
End: 2023-03-07
Payer: COMMERCIAL

## 2023-03-07 VITALS
RESPIRATION RATE: 18 BRPM | HEIGHT: 70 IN | DIASTOLIC BLOOD PRESSURE: 92 MMHG | WEIGHT: 287.63 LBS | HEART RATE: 83 BPM | SYSTOLIC BLOOD PRESSURE: 130 MMHG | BODY MASS INDEX: 41.18 KG/M2 | OXYGEN SATURATION: 98 %

## 2023-03-07 DIAGNOSIS — B27.90 PHARYNGITIS DUE TO INFECTIOUS MONONUCLEOSIS: Primary | ICD-10-CM

## 2023-03-07 LAB
CONTROL LINE PRESENT WITH A CLEAR BACKGROUND (YES/NO): YES YES/NO
KIT LOT #: 5675 NUMERIC
STREP GRP A CUL-SCR: NEGATIVE

## 2023-03-07 PROCEDURE — 87081 CULTURE SCREEN ONLY: CPT | Performed by: PHYSICIAN ASSISTANT

## 2023-03-07 PROCEDURE — 99213 OFFICE O/P EST LOW 20 MIN: CPT | Performed by: PHYSICIAN ASSISTANT

## 2023-03-07 PROCEDURE — 3080F DIAST BP >= 90 MM HG: CPT | Performed by: PHYSICIAN ASSISTANT

## 2023-03-07 PROCEDURE — 3008F BODY MASS INDEX DOCD: CPT | Performed by: PHYSICIAN ASSISTANT

## 2023-03-07 PROCEDURE — 3075F SYST BP GE 130 - 139MM HG: CPT | Performed by: PHYSICIAN ASSISTANT

## 2023-03-07 PROCEDURE — 87880 STREP A ASSAY W/OPTIC: CPT | Performed by: PHYSICIAN ASSISTANT

## 2023-03-07 RX ORDER — METHYLPREDNISOLONE 4 MG/1
TABLET ORAL
Qty: 1 EACH | Refills: 0 | Status: SHIPPED | OUTPATIENT
Start: 2023-03-07

## 2023-03-07 RX ORDER — LIDOCAINE HYDROCHLORIDE 20 MG/ML
SOLUTION OROPHARYNGEAL
Qty: 100 ML | Refills: 0 | Status: SHIPPED | OUTPATIENT
Start: 2023-03-07

## 2023-03-08 NOTE — TELEPHONE ENCOUNTER
Exception from received from [facility] . For wegovy 0.25, form filled out and faxed with the recent chart notes attached.   Phone  # [phone]  Fax  [phone]

## 2023-03-27 ENCOUNTER — TELEPHONE (OUTPATIENT)
Dept: INTERNAL MEDICINE CLINIC | Facility: CLINIC | Age: 38
End: 2023-03-27

## 2023-04-30 RX ORDER — EMPAGLIFLOZIN 10 MG/1
TABLET, FILM COATED ORAL
Qty: 90 TABLET | Refills: 0 | Status: SHIPPED | OUTPATIENT
Start: 2023-04-30

## 2023-07-14 ENCOUNTER — HOSPITAL ENCOUNTER (OUTPATIENT)
Age: 38
Discharge: HOME | End: 2023-07-14
Payer: COMMERCIAL

## 2023-07-14 VITALS
TEMPERATURE: 98 F | OXYGEN SATURATION: 98 % | HEART RATE: 85 BPM | WEIGHT: 275 LBS | BODY MASS INDEX: 39 KG/M2 | DIASTOLIC BLOOD PRESSURE: 92 MMHG | RESPIRATION RATE: 20 BRPM | SYSTOLIC BLOOD PRESSURE: 134 MMHG

## 2023-07-14 DIAGNOSIS — K12.2 UVULITIS: Primary | ICD-10-CM

## 2023-07-14 LAB
S PYO AG THROAT QL: NEGATIVE
SARS-COV-2 RNA RESP QL NAA+PROBE: NOT DETECTED

## 2023-07-14 RX ORDER — DEXAMETHASONE 4 MG/1
10 TABLET ORAL ONCE
Status: COMPLETED | OUTPATIENT
Start: 2023-07-14 | End: 2023-07-14

## 2023-08-17 RX ORDER — EMPAGLIFLOZIN 10 MG/1
TABLET, FILM COATED ORAL
Qty: 90 TABLET | Refills: 0 | Status: SHIPPED | OUTPATIENT
Start: 2023-08-17

## 2023-08-17 RX ORDER — DIETHYLPROPION HYDROCHLORIDE 75 MG/1
TABLET ORAL
Qty: 30 TABLET | Refills: 0 | OUTPATIENT
Start: 2023-08-17

## 2023-08-28 ENCOUNTER — OFFICE VISIT (OUTPATIENT)
Dept: INTERNAL MEDICINE CLINIC | Facility: CLINIC | Age: 38
End: 2023-08-28
Payer: COMMERCIAL

## 2023-08-28 VITALS
HEART RATE: 88 BPM | BODY MASS INDEX: 42.8 KG/M2 | SYSTOLIC BLOOD PRESSURE: 132 MMHG | WEIGHT: 299 LBS | DIASTOLIC BLOOD PRESSURE: 78 MMHG | OXYGEN SATURATION: 96 % | RESPIRATION RATE: 18 BRPM | HEIGHT: 70 IN

## 2023-08-28 DIAGNOSIS — E78.2 MIXED HYPERLIPIDEMIA: ICD-10-CM

## 2023-08-28 DIAGNOSIS — Z51.81 THERAPEUTIC DRUG MONITORING: Primary | ICD-10-CM

## 2023-08-28 DIAGNOSIS — R06.83 SNORING: ICD-10-CM

## 2023-08-28 DIAGNOSIS — R63.5 WEIGHT GAIN: ICD-10-CM

## 2023-08-28 DIAGNOSIS — E66.01 OBESITY, MORBID, BMI 40.0-49.9 (HCC): ICD-10-CM

## 2023-08-28 DIAGNOSIS — Z79.4 TYPE 2 DIABETES MELLITUS WITHOUT COMPLICATION, WITH LONG-TERM CURRENT USE OF INSULIN (HCC): ICD-10-CM

## 2023-08-28 DIAGNOSIS — E11.9 TYPE 2 DIABETES MELLITUS WITHOUT COMPLICATION, WITH LONG-TERM CURRENT USE OF INSULIN (HCC): ICD-10-CM

## 2023-08-28 DIAGNOSIS — E55.9 VITAMIN D DEFICIENCY: ICD-10-CM

## 2023-08-28 RX ORDER — DIETHYLPROPION HYDROCHLORIDE 75 MG/1
TABLET ORAL
COMMUNITY
Start: 2023-04-14 | End: 2023-08-28

## 2023-08-28 RX ORDER — PHENTERMINE HYDROCHLORIDE 37.5 MG/1
37.5 TABLET ORAL
Qty: 30 TABLET | Refills: 2 | Status: SHIPPED | OUTPATIENT
Start: 2023-08-28

## 2023-08-28 RX ORDER — PEN NEEDLE, DIABETIC 30 GX3/16"
1 NEEDLE, DISPOSABLE MISCELLANEOUS DAILY
Qty: 100 EACH | Refills: 1 | Status: SHIPPED | OUTPATIENT
Start: 2023-08-28

## 2023-08-28 RX ORDER — SEMAGLUTIDE 0.68 MG/ML
0.25 INJECTION, SOLUTION SUBCUTANEOUS WEEKLY
Qty: 3 ML | Refills: 1 | Status: SHIPPED | OUTPATIENT
Start: 2023-08-28

## 2023-08-28 NOTE — PATIENT INSTRUCTIONS
Next steps:  1. Fill your prescribed medication and take as discussed and prescribed: jardance  ozempic 0.25mg X 3 weeks, 0.5mg X 4 weeks and 1mg X 4 weeks and stay at this dose   Restart phentermine  2. Schedule a personal nutrition consultation with one of our registered dieticians     Please try to work on the following dietary changes:  Daily protein recommendation to start:  grams  Daily carbohydrate: <175g  Daily calories: 2,200-2,300  1. Drink water with meals and throughout the day, cut down on soda and/or juice if consumed. Consider flavored water options like Bubbly, Spindrift, Hint and Fabian. 2.  Eat breakfast daily and focus on having protein with each meal, examples include: greek yogurt, cottage cheese, hard boiled egg, whole grain toast with peanut butter. 3.  Reduce refined carbohydrates and sugars which includes items such as sweets, as well as rice, pasta, and bread and make sure to choose whole grain options when having them with just 1 serving per meal about the size of your inner palm. 4.  Consume non starchy veggies daily working towards making them a good 50% of your daily food intake. Add them to lunch and dinner consistently. 5.  Start a daily probiotic: VSL#3 is recommended, (order on line at www.vsl3. com). Take 1 capsule daily with water for 30 days, then reduce to 1 every other day (this will reduce the cost). Capsules can be left out for 2 weeks, but then must be refrigerated. Please download mary My Fitness Jonathon Kayser! Or Net Diary to monitor daily dietary intake and you will be able to see if you are eating the right amount of calories or too much or too little which would hinder weight loss. Additionally this will help to see your daily carbohydrate and protein intake. When you set the mary up choose 1-2 lbs/week as a goal.  Keeping a paper food journal is an option as well to remain accountable for your choices- this is the start to mindful eating!  A low calorie diet has been consistently shown to support weight loss. Continue or start exercising to help establish a routine. If not already exercising begin with 1 day and progress as able with long-term goal of 30 minutes 5 days a week at a minimum. Meditation daily can help manage and control stress. Chronic stress can make weight loss difficult. Exercising is one way to help with stress, but meditation using the CALM George or another comparable alternative can be done in your home or place of work with little time commitment. This George can also help work on behavior change and improve sleep. Check out the segment under Calm Masterclass and listen to The 4 Pillars of Health. A great way to begin learning about the foundation of lifestyle with practical tips to use in your every day. Check out www.yourweightmatters. org blog for continued daily support and education along this weight loss journey! Patient Resources:     Personal Training/Fitness Classes/Health Coaching     Jo Greco and Taveras Brenda @ [email or web address]/ Full fitness center with group fitness and personal training. Discount available as client of [facility]. Health Coaching and Personal Training with Stanford Keith at our [facility]- individual weekly coaching with option to add personal training and small group fitness classes targeted at weight loss- [phone] and/or email @ Nova Ordoñez. [email or web address]  360FIT Tracie [email or web address] Group Fitness [phone] and/or email Lauren Morrissey at [email or web address]  [address] [facility] with multiple locations: Aetna (www.Central Security Group. Trumaker), Eat The Frog Fitness (www.Mobyko. Trumaker), Fit Body Bootcamp (www.TrumakerboBlowtorchp.Trumaker), LegalReach (www.imoji. Trumaker), The Exercise  (www.exercisecoachREGEN Energy)     Online Fitness  Fitness  on Whole Foods in 10 DVD series- www. cjgrn91OAR. 55tuan.com  Sit and Be Fit - Chair exercise series Www.sitandbefit. org  Hip Hop Fit with Brown Laurent at www.hiphopfit. net     Apps for on [facility] 7 Minute Workout (orange box with white 7) - free on the go HIIT training george  Peloton George @ wwwVisual TeleHealth Systems     Nutrition Trackers and Tools  LoseIT! And My Fitness Pal apps and on line for tracking nutrition  NOOM - virtual health coaching  FitFoundation (healthy meals on the go) in [employer] @ www. tswfiacdanwpg7o. Caitlyn Rose MD @ www.OQOdFrockadvisor and Nai Will (keto and low carb plans recommended) @ www. UUWUMQ32.UIG, Metabolic Meals @ www. Gripati Digital EntertainmentMetabolicMeals. com - individual prepared meals to go  Adomo, Pilgrim Software, International Business Machines, Every Plate, Sarentis Therapeutics- on line meal delivery programs for preparation at home  AK [employer] in [place] for homemade meals to go @ wwwForwardMetricsmealMobile Ads. 55tuan.com  Diet Doctor @ www. dietdoctor. 55tuan.com - low carb swaps  YuVerafin - meal prep and planning george (www.yummly. com)     Stress Management/Behavior/Mindful Eating  CALM meditation george (www.calm. 55tuan.com)  Headspace  Am I Hungry? Mindful eating virtual  george  Www.yourweightmatters. org - Obesity Action Coalition sponsored Blog posts daily  Motivation george (black box with white \")- daily supportive messages sent to your phone     Books/Video Education/Podcasts  Mindless Eating by Marlea Claude  Why We Get Sick by Romero Vicente (a book about insulin resistance)  Atomic Habits by Donita Diaz (a book about taking small steps to promote greater behavior change)   Can't Hurt Me by Elizabeth Smith (a book exploring the power of discipline in achieving your goals)  The End of Dieting: How to Live for Life by Dr. Phil Carlson M.D. or listen to The [address] "[employer] [address] Episode 61: Understanding \"Nutritarian\" Eating w/Dr. Phil Carlson  Your Body in Balance:  The World Fuel Services Corporation of Food, Hormones, and Health by Dr. Familia Chun  The Menopause Diet Plan by Karin Carr and [facility] AT [facility]  The Complete Guide to fasting by Dr. Irlanda Huff, [address] [facility] by Josey Coronel, Ph.D, R.D. Weight Loss Surgery Will Not Treat Food Addiction by Ifrah Valdez Ph.D  The [address] [place] [address] on plant based nutrition  Fed Up - documentary about obesity (Free on [facility])  The Truth About Sugar - documentary on sugar (Free on Utube, https://youtu. be/4J5lptkTE0v)  The Dr. Blaise Llamas by Dr. Bandar Tovar MD  Fitlosophy Fitspiration - journal to better health (found at [employer] in fitness aisle)  What Happened to You?- a look at the impact trauma has on behavior written by Regis Trujillo and Dr. Fatima Lot Again by Sonja Vicente - discovering your true self after trauma  Marely Oliva talk on iGrow - Dein Lernprogramm im Leben, The Call to Courage  Podcasts: The Exam Room by the Physician's Committee, Nutrition Facts by Dr. Hendrix Shows    We are here to support you with weight loss, but please remember that you still need your primary care provider for your routine health maintenance.

## 2023-10-18 ENCOUNTER — OFFICE VISIT (OUTPATIENT)
Dept: INTERNAL MEDICINE CLINIC | Facility: CLINIC | Age: 38
End: 2023-10-18
Payer: COMMERCIAL

## 2023-10-18 VITALS
RESPIRATION RATE: 16 BRPM | BODY MASS INDEX: 41.52 KG/M2 | HEIGHT: 70 IN | HEART RATE: 97 BPM | SYSTOLIC BLOOD PRESSURE: 124 MMHG | OXYGEN SATURATION: 100 % | WEIGHT: 290 LBS | DIASTOLIC BLOOD PRESSURE: 88 MMHG

## 2023-10-18 DIAGNOSIS — Z79.4 TYPE 2 DIABETES MELLITUS WITHOUT COMPLICATION, WITH LONG-TERM CURRENT USE OF INSULIN (HCC): ICD-10-CM

## 2023-10-18 DIAGNOSIS — E11.9 TYPE 2 DIABETES MELLITUS WITHOUT COMPLICATION, WITH LONG-TERM CURRENT USE OF INSULIN (HCC): ICD-10-CM

## 2023-10-18 DIAGNOSIS — E55.9 VITAMIN D DEFICIENCY: ICD-10-CM

## 2023-10-18 DIAGNOSIS — Z51.81 THERAPEUTIC DRUG MONITORING: Primary | ICD-10-CM

## 2023-10-18 DIAGNOSIS — R06.83 SNORING: ICD-10-CM

## 2023-10-18 DIAGNOSIS — E66.01 OBESITY, MORBID, BMI 40.0-49.9 (HCC): ICD-10-CM

## 2023-10-18 DIAGNOSIS — E78.2 MIXED HYPERLIPIDEMIA: ICD-10-CM

## 2023-10-18 PROCEDURE — 3008F BODY MASS INDEX DOCD: CPT | Performed by: NURSE PRACTITIONER

## 2023-10-18 PROCEDURE — 3074F SYST BP LT 130 MM HG: CPT | Performed by: NURSE PRACTITIONER

## 2023-10-18 PROCEDURE — 3079F DIAST BP 80-89 MM HG: CPT | Performed by: NURSE PRACTITIONER

## 2023-10-18 PROCEDURE — 99213 OFFICE O/P EST LOW 20 MIN: CPT | Performed by: NURSE PRACTITIONER

## 2023-10-18 RX ORDER — SEMAGLUTIDE 1.34 MG/ML
1 INJECTION, SOLUTION SUBCUTANEOUS WEEKLY
Qty: 9 ML | Refills: 0 | Status: SHIPPED | OUTPATIENT
Start: 2023-10-18

## 2023-10-18 NOTE — PATIENT INSTRUCTIONS
Next steps:  1. Fill your prescribed medication and take as discussed and prescribed: jardance, ozempic 1mg weekly, and phentermine  2. Schedule a personal nutrition consultation with one of our registered dieticians     Please try to work on the following dietary changes:  Daily protein recommendation to start:  grams  Daily carbohydrate: <175g  Daily calories: 2,200-2,300  1. Drink water with meals and throughout the day, cut down on soda and/or juice if consumed. Consider flavored water options like Bubbly, Spindrift, Hint and Fabian. 2.  Eat breakfast daily and focus on having protein with each meal, examples include: greek yogurt, cottage cheese, hard boiled egg, whole grain toast with peanut butter. 3.  Reduce refined carbohydrates and sugars which includes items such as sweets, as well as rice, pasta, and bread and make sure to choose whole grain options when having them with just 1 serving per meal about the size of your inner palm. 4.  Consume non starchy veggies daily working towards making them a good 50% of your daily food intake. Add them to lunch and dinner consistently. 5.  Start a daily probiotic: VSL#3 is recommended, (order on line at www.vsl3. com). Take 1 capsule daily with water for 30 days, then reduce to 1 every other day (this will reduce the cost). Capsules can be left out for 2 weeks, but then must be refrigerated. Please download mayr My Fitness Lexi Reading! Or Net Diary to monitor daily dietary intake and you will be able to see if you are eating the right amount of calories or too much or too little which would hinder weight loss. Additionally this will help to see your daily carbohydrate and protein intake. When you set the mary up choose 1-2 lbs/week as a goal.  Keeping a paper food journal is an option as well to remain accountable for your choices- this is the start to mindful eating! A low calorie diet has been consistently shown to support weight loss.      Continue or start exercising to help establish a routine. If not already exercising begin with 1 day and progress as able with long-term goal of 30 minutes 5 days a week at a minimum. Meditation daily can help manage and control stress. Chronic stress can make weight loss difficult. Exercising is one way to help with stress, but meditation using the CALM George or another comparable alternative can be done in your home or place of work with little time commitment. This George can also help work on behavior change and improve sleep. Check out the segment under Calm Masterclass and listen to The 4 Pillars of Health. A great way to begin learning about the foundation of lifestyle with practical tips to use in your every day. Check out www.yourweightmatters. org blog for continued daily support and education along this weight loss journey! Patient Resources:     Personal Training/Fitness Classes/Health Coaching     Jo 112 and Nitin Miriamaubrey @ [email or web address] Full fitness center with group fitness and personal training. Discount available as client of [facility]. Health Coaching and Personal Training with Khalif Sampson at our [facility]- individual weekly coaching with option to add personal training and small group fitness classes targeted at weight loss- [phone] and/or email @ [email or web address]  360FIT Tracie [email or web address] Group Fitness [phone] and/or email Ashley Gaming at [email or web address]  [address] [facility] with multiple locations: Aetna (www.FST Life Sciences. PetroFeed), Eat The Frog Fitness (www.QVPN. PetroFeed), Fit Body Bootcamp (www.Centrifuge Systemsbodybootcamp.PetroFeed), Teez.by Fitness (www.Inoapps. PetroFeed), The Exercise  (www.exercisecoachTaodangpu)     Online Fitness  Fitness  on Whole Foods in 10 DVD series- www. pbbph84LYO. PetroFeed  Sit and Be Fit - Chair exercise series Www.sitandbefit. org  Hip Hop Fit with Brown Laurent at www.hiphopfit. net     Apps for on the NexSteppe 7 Minute Workout (orange box with white 7) - free on the go HIIT training george  Peloton George @ wwwHead Held High     Nutrition Trackers and Tools  LoseIT! And My Fitness Pal apps and on line for tracking nutrition  NOOM - virtual health coaching  FitFoundation (healthy meals on the go) in [facility]-SCI @ www. wkwaejsuopxfn5k. Javy Lindquist MD @ wwwJiahedMoonbasa and Gwenith Goldberg (keto and low carb plans recommended) @ www. GIOZCD12.TIR, Metabolic Meals @ www. CheviaMetabolicMeals. com - individual prepared meals to go  Novitas, Ancora Pharmaceuticals, International Business Machines, Every Plate, FetchBack- on line meal delivery programs for preparation at home  AK IPWireless in [place] for homemade meals to go @ wwwSwarm. Global Care Quest  Diet Doctor @ www. dietdoctor. com - low carb swaps  Yummly - meal prep and planning george (www.yummly. com)     Stress Management/Behavior/Mindful Eating  CALM meditation george (www.calm. Global Care Quest)  Headspace  Am I Hungry? Mindful eating virtual  george  Www.yourweightmatters. org - Obesity Action Coalition sponsored Blog posts daily  Motivation george (black box with white \")- daily supportive messages sent to your phone     Books/Video Education/Podcasts  Mindless Eating by Sandra Aaron  Why We Get Sick by Linda Pruitt (a book about insulin resistance)  Atomic Habits by Taylor Maguire (a book about taking small steps to promote greater behavior change)   Can't Hurt Me by Jeanne Torres (a book exploring the power of discipline in achieving your goals)  The End of Dieting: How to Live for Life by Dr. Manish Keller M.D. or listen to The [address] Episode 61: Understanding \"Nutritarian\" Eating w/Dr. Manish Keller  Your Body in Balance:  The World Fuel Services Corporation of Food, Hormones, and Health by Dr. Heriberto Meyer  The Menopause Diet Plan by Lamont Perales [facility] AT [facility]  The Complete Guide to fasting by Dr. Sigrid Marcus, [address] [facility] by Derek Motley Charley Gray, Ph.D, R.D. Weight Loss Surgery Will Not Treat Food Addiction by Antonette Gilmore Ph.D  The [address] [place] [address] on plant based nutrition  Fed Up - documentary about obesity (Free on [place])  The Truth About Sugar - documentary on sugar (Free on Mitek Systems, https://youtu. be/6K9vgzvIE1l)  The Dr. Singh Saeid by Dr. Tariq Bey MD  Fitlosophy Fitspiration - journal to better health (found at [employer] in fitness aisle)  What Happened to You?- a look at the impact trauma has on behavior written by Zaida Fisher and Dr. Vo Ship Again by Tracey Woodard - discovering your true self after trauma  Theresa Nathan talk on Interview, The Call to GameLayers  Podcasts: The Exam Room by the Physician's Committee, Nutrition Facts by Dr. Izzy Layton    We are here to support you with weight loss, but please remember that you still need your primary care provider for your routine health maintenance.

## 2023-12-03 DIAGNOSIS — Z79.4 TYPE 2 DIABETES MELLITUS WITHOUT COMPLICATION, WITH LONG-TERM CURRENT USE OF INSULIN (HCC): Primary | ICD-10-CM

## 2023-12-03 DIAGNOSIS — E11.9 TYPE 2 DIABETES MELLITUS WITHOUT COMPLICATION, WITH LONG-TERM CURRENT USE OF INSULIN (HCC): Primary | ICD-10-CM

## 2023-12-03 DIAGNOSIS — Z51.81 THERAPEUTIC DRUG MONITORING: ICD-10-CM

## 2023-12-03 RX ORDER — PHENTERMINE HYDROCHLORIDE 37.5 MG/1
37.5 TABLET ORAL
Qty: 30 TABLET | Refills: 2 | Status: SHIPPED | OUTPATIENT
Start: 2023-12-03

## 2023-12-03 NOTE — TELEPHONE ENCOUNTER
Requesting   Requested Prescriptions     Pending Prescriptions Disp Refills    empagliflozin (JARDIANCE) 10 MG Oral Tab 90 tablet 0     Sig: Take 1 tablet (10 mg total) by mouth.     Phentermine HCl 37.5 MG Oral Tab 30 tablet 2     Sig: Take 1 tablet (37.5 mg total) by mouth every morning before breakfast.         LOV: 10/18/23  RTC: 3 months   Last Relevant Labs:   Filled: Jardiance  8/17/23 #90 with 0 refills  Phentermine 8/28/23 #30 with 2 refills     Future Appointments   Date Time Provider Cristina Franks   12/17/2023  8:15 PM SCHEDULE BY DATE [facility] to make appt

## 2023-12-11 ENCOUNTER — OFFICE VISIT (OUTPATIENT)
Dept: INTERNAL MEDICINE CLINIC | Facility: CLINIC | Age: 38
End: 2023-12-11
Payer: COMMERCIAL

## 2023-12-11 VITALS
SYSTOLIC BLOOD PRESSURE: 122 MMHG | RESPIRATION RATE: 16 BRPM | DIASTOLIC BLOOD PRESSURE: 78 MMHG | BODY MASS INDEX: 40.09 KG/M2 | WEIGHT: 280 LBS | HEART RATE: 90 BPM | HEIGHT: 70 IN

## 2023-12-11 DIAGNOSIS — Z51.81 THERAPEUTIC DRUG MONITORING: Primary | ICD-10-CM

## 2023-12-11 DIAGNOSIS — Z79.4 TYPE 2 DIABETES MELLITUS WITHOUT COMPLICATION, WITH LONG-TERM CURRENT USE OF INSULIN (HCC): ICD-10-CM

## 2023-12-11 DIAGNOSIS — E66.01 OBESITY, MORBID, BMI 40.0-49.9 (HCC): ICD-10-CM

## 2023-12-11 DIAGNOSIS — E11.9 TYPE 2 DIABETES MELLITUS WITHOUT COMPLICATION, WITH LONG-TERM CURRENT USE OF INSULIN (HCC): ICD-10-CM

## 2023-12-11 DIAGNOSIS — E55.9 VITAMIN D DEFICIENCY: ICD-10-CM

## 2023-12-11 DIAGNOSIS — E78.2 MIXED HYPERLIPIDEMIA: ICD-10-CM

## 2023-12-11 DIAGNOSIS — R06.83 SNORING: ICD-10-CM

## 2023-12-11 PROCEDURE — 99213 OFFICE O/P EST LOW 20 MIN: CPT | Performed by: NURSE PRACTITIONER

## 2023-12-11 PROCEDURE — 3074F SYST BP LT 130 MM HG: CPT | Performed by: NURSE PRACTITIONER

## 2023-12-11 PROCEDURE — 3078F DIAST BP <80 MM HG: CPT | Performed by: NURSE PRACTITIONER

## 2023-12-11 PROCEDURE — 3008F BODY MASS INDEX DOCD: CPT | Performed by: NURSE PRACTITIONER

## 2023-12-11 RX ORDER — SEMAGLUTIDE 1.34 MG/ML
1 INJECTION, SOLUTION SUBCUTANEOUS WEEKLY
Qty: 3 ML | Refills: 2 | Status: SHIPPED | OUTPATIENT
Start: 2023-12-11

## 2023-12-11 NOTE — PATIENT INSTRUCTIONS
Next steps:  1. Fill your prescribed medication and take as discussed and prescribed: jardance 10mg, ozempic 1mg and phentermine 37.5mg   2. Schedule a personal nutrition consultation with one of our registered dieticians     Please try to work on the following dietary changes:  Daily protein recommendation to start:  grams  Daily carbohydrate: <170g  Daily calories: 2,100  1. Drink water with meals and throughout the day, cut down on soda and/or juice if consumed. Consider flavored water options like Bubbly, Spindrift, Hint and Fabian. 2.  Eat breakfast daily and focus on having protein with each meal, examples include: greek yogurt, cottage cheese, hard boiled egg, whole grain toast with peanut butter. 3.  Reduce refined carbohydrates and sugars which includes items such as sweets, as well as rice, pasta, and bread and make sure to choose whole grain options when having them with just 1 serving per meal about the size of your inner palm. 4.  Consume non starchy veggies daily working towards making them a good 50% of your daily food intake. Add them to lunch and dinner consistently. 5.  Start a daily probiotic: VSL#3 is recommended, (order on line at www.vsl3. com). Take 1 capsule daily with water for 30 days, then reduce to 1 every other day (this will reduce the cost). Capsules can be left out for 2 weeks, but then must be refrigerated. Please download mary My Fitness Samariashad Stevenson! Or Net Diary to monitor daily dietary intake and you will be able to see if you are eating the right amount of calories or too much or too little which would hinder weight loss. Additionally this will help to see your daily carbohydrate and protein intake. When you set the mary up choose 1-2 lbs/week as a goal.  Keeping a paper food journal is an option as well to remain accountable for your choices- this is the start to mindful eating! A low calorie diet has been consistently shown to support weight loss.      Continue or start exercising to help establish a routine. If not already exercising begin with 1 day and progress as able with long-term goal of 30 minutes 5 days a week at a minimum. Meditation daily can help manage and control stress. Chronic stress can make weight loss difficult. Exercising is one way to help with stress, but meditation using the CALM George or another comparable alternative can be done in your home or place of work with little time commitment. This George can also help work on behavior change and improve sleep. Check out the segment under Calm Masterclass and listen to The 4 Pillars of Health. A great way to begin learning about the foundation of lifestyle with practical tips to use in your every day. Check out www.yourweightmatters. org blog for continued daily support and education along this weight loss journey! Patient Resources:     Personal Training/Fitness Classes/Health Coaching     [facility] KLEBERG and [place] @ [email or web address]/ Full fitness center with group fitness and personal training. Discount available as client of [facility] Weight Management. Health Coaching and Personal Training with Rianna Roca at our [facility]- individual weekly coaching with option to add personal training and small group fitness classes targeted at weight loss- [phone] and/or email @ [email or web address]  360FIT [place] [email or web address] Group Fitness [phone] and/or email Pilar Pendleton at [email or web address]  [address] [facility] with multiple locations: Aetna (www.Academia.edu. Pinshape), Eat The Frog Fitness (www.Mengero. Pinshape), Fit Body Bootcamp (www.Rheingau Foundersbodybootcamp.Pinshape), Carbylan BioSurgery Fitness (www.Park City Group. Pinshape), The Exercise  (www.exercisecoach.Pinshape)     Online Fitness  Fitness  on Whole Foods in 10 DVD series- www. usvyv29IYN. Pinshape  Sit and Be Fit - Chair exercise series Www.sitandbefit. org  Hip Hop Fit with Brown Laurent at www.hiphopfit. net     Apps for on the "[employer] 7 Minute Workout (orange box with white 7) - free on the go HIIT training george  Peloton George @ wwwBlink for iPhone and Android     Nutrition Trackers and Tools  LoseIT! And My Fitness Pal apps and on line for tracking nutrition  NOOM - virtual health coaching  FitFoundation (healthy meals on the go) in [facility] Hospital[facility]-SCI @ www. nocphrmsubusx1j. Drake Rios MD @ www.Karos HealthtromdCultureMap and Nii Talley (keto and low carb plans recommended) @ www. CNTCKR08.EIX, Metabolic Meals @ www. QReserve Inc.MetabolicMeals. com - individual prepared meals to go  BRIKA, BookBub, International Business Machines, Every Plate, Mediastay- on line meal delivery programs for preparation at home  AK The Chapar in [place] for homemade meals to go @ wwwLLamasoft. Boston Power  Diet Doctor @ www. dietdoctor. com - low carb swaps  Yummly - meal prep and planning george (www.yummly. com)     Stress Management/Behavior/Mindful Eating  CALM meditation george (www.calm. Boston Power)  Headspace  Am I Hungry? Mindful eating virtual  george  Www.yourweightmatters. org - Obesity Action Coalition sponsored Blog posts daily  Motivation george (black box with white \")- daily supportive messages sent to your phone     Books/Video Education/Podcasts  Mindless Eating by Zeenat Estrada  Why We Get Sick by Lam Mora (a book about insulin resistance)  Atomic Habits by Mark Abraham (a book about taking small steps to promote greater behavior change)   Can't Hurt Me by Ceci Cardoza (a book exploring the power of discipline in achieving your goals)  The End of Dieting: How to Live for Life by Dr. Sravani Horton M.D. or listen to The [address] [employer] [facility] [address] Episode 61: Understanding \"Nutritarian\" Eating w/Dr. Sravani Horton  Your Body in Balance:  The World Fuel Services Corporation of Food, Hormones, and Health by Dr. Devora Cortez  The Menopause Diet Plan by Tiffani Babcock and [facility] AT [facility]  The Complete Guide to fasting by Dr. Marcelle Dunn, [facility] by Iola Duane Tre Waite, Ph.D, R.D. Weight Loss Surgery Will Not Treat Food Addiction by Stephanie Gold Ph.D  The [address] [place] [address] on plant based nutrition  Fed Up - documentary about obesity (Free on [place])  The Truth About Sugar - documentary on sugar (Free on OMEGA MORGAN, https://youtu. be/2C8mypiVB0b)  The Dr. Cooper Nail by Dr. Crow Bowen MD  Fitlosophy Fitspiration - journal to better health (found at [employer] in fitness aisle)  What Happened to You?- a look at the impact trauma has on behavior written by Charla Schroeder and Dr. Danielle Frost Again by Ilsa Grajeda - discovering your true self after trauma  Jason Painter talk on Chicisimo, The Call to Mob Science  Podcasts: The Exam Room by the Physician's Committee, Nutrition Facts by Dr. Lonell Claude    We are here to support you with weight loss, but please remember that you still need your primary care provider for your routine health maintenance.

## 2023-12-13 ENCOUNTER — PATIENT MESSAGE (OUTPATIENT)
Dept: INTERNAL MEDICINE CLINIC | Facility: CLINIC | Age: 38
End: 2023-12-13

## 2023-12-17 ENCOUNTER — OFFICE VISIT (OUTPATIENT)
Dept: SLEEP CENTER | Age: 38
End: 2023-12-17
Attending: NURSE PRACTITIONER
Payer: COMMERCIAL

## 2023-12-17 DIAGNOSIS — Z79.4 TYPE 2 DIABETES MELLITUS WITHOUT COMPLICATION, WITH LONG-TERM CURRENT USE OF INSULIN (HCC): ICD-10-CM

## 2023-12-17 DIAGNOSIS — Z51.81 THERAPEUTIC DRUG MONITORING: Primary | ICD-10-CM

## 2023-12-17 DIAGNOSIS — R06.83 SNORING: ICD-10-CM

## 2023-12-17 DIAGNOSIS — E11.9 TYPE 2 DIABETES MELLITUS WITHOUT COMPLICATION, WITH LONG-TERM CURRENT USE OF INSULIN (HCC): ICD-10-CM

## 2023-12-17 DIAGNOSIS — E78.2 MIXED HYPERLIPIDEMIA: ICD-10-CM

## 2023-12-17 PROCEDURE — 95810 POLYSOM 6/> YRS 4/> PARAM: CPT

## 2023-12-20 ENCOUNTER — SLEEP STUDY (OUTPATIENT)
Facility: CLINIC | Age: 38
End: 2023-12-20
Payer: COMMERCIAL

## 2023-12-20 DIAGNOSIS — G47.33 OBSTRUCTIVE SLEEP APNEA SYNDROME: Primary | ICD-10-CM

## 2023-12-20 PROCEDURE — 95810 POLYSOM 6/> YRS 4/> PARAM: CPT | Performed by: OTHER

## 2023-12-29 ENCOUNTER — TELEPHONE (OUTPATIENT)
Facility: CLINIC | Age: 38
End: 2023-12-29

## 2023-12-29 DIAGNOSIS — E11.9 TYPE 2 DIABETES MELLITUS WITHOUT COMPLICATION, WITH LONG-TERM CURRENT USE OF INSULIN (HCC): ICD-10-CM

## 2023-12-29 DIAGNOSIS — Z79.4 TYPE 2 DIABETES MELLITUS WITHOUT COMPLICATION, WITH LONG-TERM CURRENT USE OF INSULIN (HCC): ICD-10-CM

## 2023-12-29 DIAGNOSIS — R06.83 SNORING: ICD-10-CM

## 2023-12-29 DIAGNOSIS — G47.33 OBSTRUCTIVE SLEEP APNEA SYNDROME: Primary | ICD-10-CM

## 2023-12-29 DIAGNOSIS — E66.9 OBESITY (BMI 30-39.9): ICD-10-CM

## 2023-12-29 DIAGNOSIS — E11.10 DIABETIC KETOACIDOSIS WITHOUT COMA ASSOCIATED WITH TYPE 2 DIABETES MELLITUS (HCC): ICD-10-CM

## 2023-12-29 NOTE — TELEPHONE ENCOUNTER
Detailed Pretty Simplet message sent to patient, the study showed obstructive sleep apnea at AHI 59.0,  REM AHI 84.4, BMI 43.3, O2 Sat willy 67%. Explained the mechanism of CARLOS ENRIQUE. Advised treatment with continuous positive airway pressure is recommended. Instructed patient to schedule his cpap titration at [address] [place] [address]. Consult with Dr. Macho Zarco scheduled. Related recommendation to avoid alcohol, sedating medication before sleep and not to drive while drowsy. Provided call back # should patient have questions. Diagnosis: Obstructive Sleep Apnea G47.33   RECOMMENDATIONS:   1. It is recommended to proceed with CPAP titration. 2. The patient should avoid alcohol and sedative medications, as these may worsen severity of symptoms. 3. The patient should avoid drowsy driving.    4. Patient to follow up with a sleep specialist in clinic     [phone] (home)

## 2024-02-01 ENCOUNTER — OFFICE VISIT (OUTPATIENT)
Dept: INTERNAL MEDICINE CLINIC | Facility: CLINIC | Age: 39
End: 2024-02-01
Payer: COMMERCIAL

## 2024-02-01 VITALS
HEART RATE: 100 BPM | DIASTOLIC BLOOD PRESSURE: 76 MMHG | WEIGHT: 276.19 LBS | SYSTOLIC BLOOD PRESSURE: 114 MMHG | OXYGEN SATURATION: 99 % | BODY MASS INDEX: 38.67 KG/M2 | HEIGHT: 71 IN | TEMPERATURE: 98 F | RESPIRATION RATE: 18 BRPM

## 2024-02-01 DIAGNOSIS — G89.29 CHRONIC EPIGASTRIC PAIN: ICD-10-CM

## 2024-02-01 DIAGNOSIS — R10.13 CHRONIC EPIGASTRIC PAIN: ICD-10-CM

## 2024-02-01 DIAGNOSIS — R10.13 DYSPEPSIA: Primary | ICD-10-CM

## 2024-02-01 DIAGNOSIS — K21.9 GASTROESOPHAGEAL REFLUX DISEASE, UNSPECIFIED WHETHER ESOPHAGITIS PRESENT: ICD-10-CM

## 2024-02-01 PROCEDURE — 99214 OFFICE O/P EST MOD 30 MIN: CPT | Performed by: INTERNAL MEDICINE

## 2024-02-01 PROCEDURE — 87338 HPYLORI STOOL AG IA: CPT

## 2024-02-01 PROCEDURE — 3074F SYST BP LT 130 MM HG: CPT | Performed by: INTERNAL MEDICINE

## 2024-02-01 PROCEDURE — 3008F BODY MASS INDEX DOCD: CPT | Performed by: INTERNAL MEDICINE

## 2024-02-01 PROCEDURE — 3078F DIAST BP <80 MM HG: CPT | Performed by: INTERNAL MEDICINE

## 2024-02-01 RX ORDER — SUCRALFATE 1 G/1
1 TABLET ORAL
Qty: 28 TABLET | Refills: 0 | Status: SHIPPED | OUTPATIENT
Start: 2024-02-01 | End: 2024-02-08

## 2024-02-01 RX ORDER — PANTOPRAZOLE SODIUM 40 MG/1
40 TABLET, DELAYED RELEASE ORAL
Qty: 30 TABLET | Refills: 0 | Status: SHIPPED | OUTPATIENT
Start: 2024-02-01

## 2024-02-01 NOTE — PROGRESS NOTES
Torin Estrada  9/21/1985    Chief Complaint   Patient presents with    Nausea     Rm 1       SUBJECTIVE   Torin Estrada is a 38 year old male who presents for evaluation.    The patient reports a greater than 1 year duration of intermittent symptoms of heartburn, nausea, early satiety, abdominal discomfort, bloating and cramping of the belly, and burping.  These episodes are complicated by violent vomiting and diarrhea that occurs intermittently, and a couple of times per week.  There is no identified trigger, such as dietary habits, or anxiety/stress.  Symptom onset was approximately 6 to 7 months prior to initiating Ozempic.  Stools are occasionally dark, including black, however he does also occasionally take Pepto-Bismol to manage his symptoms.  Tums does offer some degree of improvement of symptoms.  No reported blood in stool.  Today he presents for further evaluation.    Review of Systems   No f/c/chest pain or sob. No cough. No ha or dizziness. No numbness, tingling, or weakness. No other complaints today.    Current Outpatient Medications   Medication Sig Dispense Refill    semaglutide (OZEMPIC, 1 MG/DOSE,) 4 MG/3ML Subcutaneous Solution Pen-injector Inject 1 mg into the skin once a week. 3 mL 2    empagliflozin (JARDIANCE) 10 MG Oral Tab Take 1 tablet (10 mg total) by mouth daily. 90 tablet 0    Phentermine HCl 37.5 MG Oral Tab Take 1 tablet (37.5 mg total) by mouth every morning before breakfast. 30 tablet 2    Insulin Pen Needle (PEN NEEDLES) 32G X 4 MM Does not apply Misc Inject 1 each into the skin daily. 100 each 1    ergocalciferol 1.25 MG (35299 UT) Oral Cap Take 1 capsule (50,000 Units total) by mouth twice a week. With food for 16 weeks total, then begin OTC Vitamin D 2000 units with food daily thereafter (Patient not taking: Reported on 2/1/2024) 32 capsule 0      No Known Allergies   Past Medical History:   Diagnosis Date    Diabetes (HCC)     Morbid obesity (HCC)       Patient Active  Problem List   Diagnosis    Diabetic acidosis without coma (HCC)    Hyponatremia    SHANNAN (acute kidney injury) (HCC)    Metabolic acidosis    Obesity, morbid, BMI 40.0-49.9 (HCC)    Diabetic ketoacidosis without coma associated with other specified diabetes mellitus (HCC)    Metabolic acidosis due to diabetes mellitus (HCC)    Dehydration    Leukocytosis    Type 2 diabetes mellitus without complication, with long-term current use of insulin (HCC)    Pes planus    Mechanical low back pain    Obesity (BMI 30-39.9)    Therapeutic drug monitoring    Vitamin D deficiency    Mixed hyperlipidemia    Snoring    Weight gain      History reviewed. No pertinent surgical history.   Social History     Socioeconomic History    Marital status: Single   Tobacco Use    Smoking status: Never    Smokeless tobacco: Never   Vaping Use    Vaping Use: Never used   Substance and Sexual Activity    Alcohol use: Not Currently    Drug use: Never   Other Topics Concern    Caffeine Concern Yes     Comment: coffee    Exercise Yes     Comment: 3x week         OBJECTIVE:   /76 (BP Location: Left arm, Patient Position: Sitting, Cuff Size: adult)   Pulse 100   Temp 97.9 °F (36.6 °C) (Skin)   Resp 18   Ht 5' 11\" (1.803 m)   Wt 276 lb 3.2 oz (125.3 kg)   SpO2 99%   BMI 38.52 kg/m²   Constitutional: Oriented to person, place, and time. No distress.   HEENT:  Normocephalic and atraumatic.  Cardiovascular: Normal rate, regular rhythm and intact distal pulses.  No murmur, rubs or gallops.   Pulmonary/Chest: Effort normal and breath sounds normal. No respiratory distress.  Abdominal: Soft, nontender, and nondistended.  Musculoskeletal: No edema  Skin: Skin is warm and dry. No rash.  Psychiatric: Normal mood and affect.     ASSESSMENT AND PLAN:   Torin Estrada is a 38 year old male who presents for evaluation.    Dyspepsia:  Suspected GERD, however gastroparesis, PUD, and H. pylori also considered  H. pylori stool test ordered prior to  initiating PPI therapy  Pantoprazole 40 mg once daily ordered  Carafate ordered  Contact office with any persistence or worsening of symptoms  Referred to GI service in light of degree and chronicity of symptoms    The patient indicates understanding of these issues and agrees to the plan.    TODAY'S ORDERS     No orders of the defined types were placed in this encounter.      Meds & Refills:  Requested Prescriptions      No prescriptions requested or ordered in this encounter       Imaging & Consults:  None    No follow-ups on file.  There are no Patient Instructions on file for this visit.    All questions were answered and the patient agrees with the plan.     Thank you,  Srinivasa Malhotra MD

## 2024-02-02 ENCOUNTER — LAB ENCOUNTER (OUTPATIENT)
Dept: LAB | Age: 39
End: 2024-02-02
Attending: INTERNAL MEDICINE
Payer: COMMERCIAL

## 2024-02-02 DIAGNOSIS — K21.9 GASTROESOPHAGEAL REFLUX DISEASE, UNSPECIFIED WHETHER ESOPHAGITIS PRESENT: ICD-10-CM

## 2024-02-02 DIAGNOSIS — R10.13 DYSPEPSIA: ICD-10-CM

## 2024-02-05 LAB — H PYLORI AG STL QL IA: NEGATIVE

## 2024-02-07 PROBLEM — E11.69 DYSLIPIDEMIA ASSOCIATED WITH TYPE 2 DIABETES MELLITUS (HCC): Status: ACTIVE | Noted: 2020-04-29

## 2024-02-07 PROBLEM — E78.5 DYSLIPIDEMIA ASSOCIATED WITH TYPE 2 DIABETES MELLITUS (HCC): Status: ACTIVE | Noted: 2020-04-29

## 2024-02-07 PROBLEM — E78.5 DYSLIPIDEMIA ASSOCIATED WITH TYPE 2 DIABETES MELLITUS  (HCC): Status: ACTIVE | Noted: 2020-04-29

## 2024-02-07 PROBLEM — E11.69 DYSLIPIDEMIA ASSOCIATED WITH TYPE 2 DIABETES MELLITUS  (HCC): Status: ACTIVE | Noted: 2020-04-29

## 2024-02-09 ENCOUNTER — LAB ENCOUNTER (OUTPATIENT)
Dept: LAB | Age: 39
End: 2024-02-09
Attending: INTERNAL MEDICINE
Payer: COMMERCIAL

## 2024-02-09 DIAGNOSIS — R12 HEARTBURN: ICD-10-CM

## 2024-02-09 DIAGNOSIS — Z79.4 TYPE 2 DIABETES MELLITUS WITHOUT COMPLICATION, WITH LONG-TERM CURRENT USE OF INSULIN (HCC): ICD-10-CM

## 2024-02-09 DIAGNOSIS — R19.7 DIARRHEA, UNSPECIFIED TYPE: ICD-10-CM

## 2024-02-09 DIAGNOSIS — E78.2 MIXED HYPERLIPIDEMIA: ICD-10-CM

## 2024-02-09 DIAGNOSIS — R10.9 ABDOMINAL CRAMPING: ICD-10-CM

## 2024-02-09 DIAGNOSIS — E11.9 TYPE 2 DIABETES MELLITUS WITHOUT COMPLICATION, WITH LONG-TERM CURRENT USE OF INSULIN (HCC): ICD-10-CM

## 2024-02-09 DIAGNOSIS — Z51.81 THERAPEUTIC DRUG MONITORING: ICD-10-CM

## 2024-02-09 DIAGNOSIS — E55.9 VITAMIN D DEFICIENCY: ICD-10-CM

## 2024-02-09 DIAGNOSIS — E66.01 OBESITY, MORBID, BMI 40.0-49.9 (HCC): ICD-10-CM

## 2024-02-09 LAB
ALBUMIN SERPL-MCNC: 4.1 G/DL (ref 3.4–5)
ALBUMIN/GLOB SERPL: 1 {RATIO} (ref 1–2)
ALP LIVER SERPL-CCNC: 47 U/L
ALT SERPL-CCNC: 37 U/L
ANION GAP SERPL CALC-SCNC: 3 MMOL/L (ref 0–18)
AST SERPL-CCNC: 21 U/L (ref 15–37)
BASOPHILS # BLD AUTO: 0.02 X10(3) UL (ref 0–0.2)
BASOPHILS NFR BLD AUTO: 0.4 %
BILIRUB SERPL-MCNC: 0.8 MG/DL (ref 0.1–2)
BUN BLD-MCNC: 14 MG/DL (ref 9–23)
CALCIUM BLD-MCNC: 9.5 MG/DL (ref 8.5–10.1)
CHLORIDE SERPL-SCNC: 107 MMOL/L (ref 98–112)
CHOLEST SERPL-MCNC: 215 MG/DL (ref ?–200)
CO2 SERPL-SCNC: 28 MMOL/L (ref 21–32)
CREAT BLD-MCNC: 0.93 MG/DL
EGFRCR SERPLBLD CKD-EPI 2021: 108 ML/MIN/1.73M2 (ref 60–?)
EOSINOPHIL # BLD AUTO: 0.28 X10(3) UL (ref 0–0.7)
EOSINOPHIL NFR BLD AUTO: 4.9 %
ERYTHROCYTE [DISTWIDTH] IN BLOOD BY AUTOMATED COUNT: 13.6 %
EST. AVERAGE GLUCOSE BLD GHB EST-MCNC: 117 MG/DL (ref 68–126)
FASTING PATIENT LIPID ANSWER: YES
FASTING STATUS PATIENT QL REPORTED: YES
GLOBULIN PLAS-MCNC: 4 G/DL (ref 2.8–4.4)
GLUCOSE BLD-MCNC: 95 MG/DL (ref 70–99)
HBA1C MFR BLD: 5.7 % (ref ?–5.7)
HCT VFR BLD AUTO: 46.7 %
HDLC SERPL-MCNC: 42 MG/DL (ref 40–59)
HGB BLD-MCNC: 15.5 G/DL
IGA SERPL-MCNC: 186 MG/DL (ref 70–312)
IMM GRANULOCYTES # BLD AUTO: 0.01 X10(3) UL (ref 0–1)
IMM GRANULOCYTES NFR BLD: 0.2 %
LDLC SERPL CALC-MCNC: 155 MG/DL (ref ?–100)
LYMPHOCYTES # BLD AUTO: 1.86 X10(3) UL (ref 1–4)
LYMPHOCYTES NFR BLD AUTO: 32.7 %
MCH RBC QN AUTO: 30.4 PG (ref 26–34)
MCHC RBC AUTO-ENTMCNC: 33.2 G/DL (ref 31–37)
MCV RBC AUTO: 91.6 FL
MONOCYTES # BLD AUTO: 0.49 X10(3) UL (ref 0.1–1)
MONOCYTES NFR BLD AUTO: 8.6 %
NEUTROPHILS # BLD AUTO: 3.02 X10 (3) UL (ref 1.5–7.7)
NEUTROPHILS # BLD AUTO: 3.02 X10(3) UL (ref 1.5–7.7)
NEUTROPHILS NFR BLD AUTO: 53.2 %
NONHDLC SERPL-MCNC: 173 MG/DL (ref ?–130)
OSMOLALITY SERPL CALC.SUM OF ELEC: 286 MOSM/KG (ref 275–295)
PLATELET # BLD AUTO: 273 10(3)UL (ref 150–450)
POTASSIUM SERPL-SCNC: 3.9 MMOL/L (ref 3.5–5.1)
PROT SERPL-MCNC: 8.1 G/DL (ref 6.4–8.2)
RBC # BLD AUTO: 5.1 X10(6)UL
SODIUM SERPL-SCNC: 138 MMOL/L (ref 136–145)
T4 FREE SERPL-MCNC: 1 NG/DL (ref 0.8–1.7)
TRIGL SERPL-MCNC: 101 MG/DL (ref 30–149)
TSI SER-ACNC: 1.39 MIU/ML (ref 0.36–3.74)
VIT B12 SERPL-MCNC: 475 PG/ML (ref 193–986)
VIT D+METAB SERPL-MCNC: 22.8 NG/ML (ref 30–100)
VLDLC SERPL CALC-MCNC: 19 MG/DL (ref 0–30)
WBC # BLD AUTO: 5.7 X10(3) UL (ref 4–11)

## 2024-02-09 PROCEDURE — 82785 ASSAY OF IGE: CPT

## 2024-02-09 PROCEDURE — 86003 ALLG SPEC IGE CRUDE XTRC EA: CPT

## 2024-02-09 PROCEDURE — 82607 VITAMIN B-12: CPT

## 2024-02-09 PROCEDURE — 84439 ASSAY OF FREE THYROXINE: CPT

## 2024-02-09 PROCEDURE — 85025 COMPLETE CBC W/AUTO DIFF WBC: CPT

## 2024-02-09 PROCEDURE — 82784 ASSAY IGA/IGD/IGG/IGM EACH: CPT

## 2024-02-09 PROCEDURE — 83036 HEMOGLOBIN GLYCOSYLATED A1C: CPT

## 2024-02-09 PROCEDURE — 80061 LIPID PANEL: CPT

## 2024-02-09 PROCEDURE — 86364 TISS TRNSGLTMNASE EA IG CLAS: CPT

## 2024-02-09 PROCEDURE — 36415 COLL VENOUS BLD VENIPUNCTURE: CPT

## 2024-02-09 PROCEDURE — 82306 VITAMIN D 25 HYDROXY: CPT

## 2024-02-09 PROCEDURE — 80053 COMPREHEN METABOLIC PANEL: CPT

## 2024-02-09 PROCEDURE — 84443 ASSAY THYROID STIM HORMONE: CPT

## 2024-02-12 LAB
ALLERGEN BRAZIL NUT: <0.1 KUA/L (ref ?–0.1)
ALMOND IGE QN: 0.52 KUA/L (ref ?–0.1)
CASHEW NUT IGE QN: <0.1 KUA/L (ref ?–0.1)
CLAM IGE QN: 0.1 KUA/L (ref ?–0.1)
CODFISH IGE QN: <0.1 KUA/L (ref ?–0.1)
CORN IGE QN: 0.81 KUA/L (ref ?–0.1)
COW MILK IGE QN: 0.11 KUA/L (ref ?–0.1)
EGG WHITE IGE QN: 0.16 KUA/L (ref ?–0.1)
HAZELNUT IGE QN: 0.32 KUA/L (ref ?–0.1)
IGE SERPL-ACNC: 1151 KU/L (ref 2–214)
PEANUT IGE QN: 2.32 KUA/L (ref ?–0.1)
SALMON IGE QN: <0.1 KUA/L (ref ?–0.1)
SCALLOP IGE QN: <0.1 KUA/L (ref ?–0.1)
SESAME SEED IGE QN: 2.2 KUA/L (ref ?–0.1)
SHRIMP IGE QN: <0.1 KUA/L (ref ?–0.1)
SOYBEAN IGE QN: 0.85 KUA/L (ref ?–0.1)
TTG IGA SER-ACNC: 1.1 U/ML (ref ?–7)
WALNUT IGE QN: 0.5 KUA/L (ref ?–0.1)
WHEAT IGE QN: 0.92 KUA/L (ref ?–0.1)

## 2024-02-12 NOTE — PROGRESS NOTES
Elevated blood sugar (a1c) it is in the prediabetes range: 5.7%- I recommend avoiding carbs, exercise, and possibly starting a medication, which will help with weight loss, prediabetes.  Low Vitamin d: please start taking ergocalciferol 50,000 U q week 12 weeks (please order #12 no refill). Then start daily maintenance vitamin D 2000 Units  Vitamin B12: stable   Thyroid:  stable  Cholesterol: (elevated total, LDL) recommend exercise, weight loss, increasing fiber, fish and nuts   If you have any questions or concerns we can discuss at your next visit   Thanks,    KADEN Cintron

## 2024-02-20 RX ORDER — SUCRALFATE 1 G/1
1 TABLET ORAL
Qty: 28 TABLET | Refills: 0 | Status: SHIPPED | OUTPATIENT
Start: 2024-02-20 | End: 2024-02-27

## 2024-02-27 RX ORDER — PANTOPRAZOLE SODIUM 40 MG/1
40 TABLET, DELAYED RELEASE ORAL
Qty: 30 TABLET | Refills: 0 | Status: SHIPPED | OUTPATIENT
Start: 2024-02-27

## 2024-03-05 DIAGNOSIS — Z51.81 THERAPEUTIC DRUG MONITORING: ICD-10-CM

## 2024-03-05 DIAGNOSIS — E11.9 TYPE 2 DIABETES MELLITUS WITHOUT COMPLICATION, WITH LONG-TERM CURRENT USE OF INSULIN (HCC): ICD-10-CM

## 2024-03-05 DIAGNOSIS — Z79.4 TYPE 2 DIABETES MELLITUS WITHOUT COMPLICATION, WITH LONG-TERM CURRENT USE OF INSULIN (HCC): ICD-10-CM

## 2024-03-06 RX ORDER — EMPAGLIFLOZIN 10 MG/1
10 TABLET, FILM COATED ORAL DAILY
Qty: 90 TABLET | Refills: 0 | Status: SHIPPED | OUTPATIENT
Start: 2024-03-06

## 2024-03-06 NOTE — TELEPHONE ENCOUNTER
Requesting   Requested Prescriptions     Pending Prescriptions Disp Refills    JARDIANCE 10 MG Oral Tab [Pharmacy Med Name: JARDIANCE 10 MG TABLET] 90 tablet 0     Sig: TAKE 1 TABLET BY MOUTH EVERY DAY     LOV: 12/11/23  RTC: 3 months  Filled: 12/3/23 #90 with 0 refills    Future Appointments   Date Time Provider Department Center   3/12/2024 10:30 AM Nydia Mcclain, DO [facility] [place]   3/25/2024  3:00 PM Guillermo Kruger MD [facility] SUB GI   3/25/2024  3:15 PM Guillermo Kruger MD [facility] SUB GI   5/8/2024  8:00 AM Guillermo Kruger MD [facility] SUB GI

## 2024-04-08 DIAGNOSIS — Z79.4 TYPE 2 DIABETES MELLITUS WITHOUT COMPLICATION, WITH LONG-TERM CURRENT USE OF INSULIN (HCC): ICD-10-CM

## 2024-04-08 DIAGNOSIS — E11.9 TYPE 2 DIABETES MELLITUS WITHOUT COMPLICATION, WITH LONG-TERM CURRENT USE OF INSULIN (HCC): ICD-10-CM

## 2024-04-08 DIAGNOSIS — Z51.81 THERAPEUTIC DRUG MONITORING: ICD-10-CM

## 2024-04-08 RX ORDER — PHENTERMINE HYDROCHLORIDE 37.5 MG/1
37.5 TABLET ORAL
Qty: 30 TABLET | Refills: 2 | Status: SHIPPED | OUTPATIENT
Start: 2024-04-08

## 2024-04-08 NOTE — TELEPHONE ENCOUNTER
Requesting   Requested Prescriptions     Pending Prescriptions Disp Refills    PHENTERMINE HCL 37.5 MG Oral Tab [Pharmacy Med Name: PHENTERMINE 37.5 MG TABLET] 30 tablet 2     Sig: TAKE 1 TABLET BY MOUTH EVERY MORNING BEFORE BREAKFAST       LOV: 12/11/2023  RTC: in about 3 months  Filled: 12/03/2023 #30 with 2 refills    Future Appointments   Date Time Provider Department Center   5/8/2024  8:00 AM Guillermo Kruger MD [facility] SUB GI   5/31/2024  1:00 PM Guillermo Kruger MD [facility] SUB GI   5/31/2024  1:15 PM Guillermo Kruger MD [facility] SUB GI   6/11/2024 10:30 AM Nydia Mcclain,  [facility] [place]

## 2024-04-26 DIAGNOSIS — Z79.4 TYPE 2 DIABETES MELLITUS WITHOUT COMPLICATION, WITH LONG-TERM CURRENT USE OF INSULIN (HCC): ICD-10-CM

## 2024-04-26 DIAGNOSIS — E11.9 TYPE 2 DIABETES MELLITUS WITHOUT COMPLICATION, WITH LONG-TERM CURRENT USE OF INSULIN (HCC): ICD-10-CM

## 2024-04-26 DIAGNOSIS — E66.01 OBESITY, MORBID, BMI 40.0-49.9 (HCC): ICD-10-CM

## 2024-04-26 DIAGNOSIS — Z51.81 THERAPEUTIC DRUG MONITORING: ICD-10-CM

## 2024-04-26 RX ORDER — SEMAGLUTIDE 1.34 MG/ML
1 INJECTION, SOLUTION SUBCUTANEOUS
Qty: 9 ML | Refills: 0 | Status: SHIPPED | OUTPATIENT
Start: 2024-04-26

## 2024-04-26 NOTE — TELEPHONE ENCOUNTER
Requesting   Requested Prescriptions     Pending Prescriptions Disp Refills    OZEMPIC, 1 MG/DOSE, 4 MG/3ML Subcutaneous Solution Pen-injector [Pharmacy Med Name: OZEMPIC 4 MG/3 ML (1 MG/DOSE)]  2     Sig: INJECT 1MG INTO THE SKIN ONCE A WEEK       LOV: 12/11/2023  RTC: in about 3 months  Filled: 12/11/2023 #3ml with 2 refills    Future Appointments   Date Time Provider Department Center   5/7/2024  2:40 PM Gladys Garces APRN [facility] [address]   5/8/2024  8:00 AM [facility]   5/31/2024  1:00 PM Guillermo Kruger MD [facility] SUB GI   5/31/2024  1:15 PM Guillermo Kruger MD [facility] SUB GI   6/11/2024 10:30 AM Nydia Mcclain, DO [facility] Pul[facility] [place]

## 2024-06-10 NOTE — PROGRESS NOTES
This is a 38 year old male who presents with the following symptoms, risk factors, behaviors or other items associated with sleep problems.    Sleep Apnea:   No data recorded  Insomnia:  No data recorded  Restless Leg:  No data recorded  Parasomnias:   No data recorded  Daytime Problems:  No data recorded    The patient's [place] Sleepiness score is 5/24.

## 2024-06-11 ENCOUNTER — TELEMEDICINE (OUTPATIENT)
Facility: CLINIC | Age: 39
End: 2024-06-11
Payer: COMMERCIAL

## 2024-06-11 ENCOUNTER — PATIENT MESSAGE (OUTPATIENT)
Facility: CLINIC | Age: 39
End: 2024-06-11

## 2024-06-11 ENCOUNTER — TELEPHONE (OUTPATIENT)
Facility: CLINIC | Age: 39
End: 2024-06-11

## 2024-06-11 DIAGNOSIS — G47.33 OBSTRUCTIVE SLEEP APNEA: Primary | ICD-10-CM

## 2024-06-11 PROCEDURE — 99204 OFFICE O/P NEW MOD 45 MIN: CPT | Performed by: OTHER

## 2024-06-11 NOTE — TELEPHONE ENCOUNTER
Nurse sent mychart to message  and left detailed vm, pt to call office and reschedule appointment for tomorrow.  Pt sleep study abnormal and physician wants to discuss results and possible options.

## 2024-06-12 ENCOUNTER — OFFICE VISIT (OUTPATIENT)
Dept: INTERNAL MEDICINE CLINIC | Facility: CLINIC | Age: 39
End: 2024-06-12
Payer: COMMERCIAL

## 2024-06-12 ENCOUNTER — TELEMEDICINE (OUTPATIENT)
Facility: CLINIC | Age: 39
End: 2024-06-12
Payer: COMMERCIAL

## 2024-06-12 VITALS
DIASTOLIC BLOOD PRESSURE: 82 MMHG | SYSTOLIC BLOOD PRESSURE: 120 MMHG | HEIGHT: 70 IN | BODY MASS INDEX: 39.65 KG/M2 | HEART RATE: 106 BPM | WEIGHT: 277 LBS | RESPIRATION RATE: 16 BRPM

## 2024-06-12 DIAGNOSIS — E66.09 CLASS 2 OBESITY DUE TO EXCESS CALORIES WITHOUT SERIOUS COMORBIDITY WITH BODY MASS INDEX (BMI) OF 39.0 TO 39.9 IN ADULT: ICD-10-CM

## 2024-06-12 DIAGNOSIS — E55.9 VITAMIN D DEFICIENCY: ICD-10-CM

## 2024-06-12 DIAGNOSIS — E11.9 TYPE 2 DIABETES MELLITUS WITHOUT COMPLICATION, WITH LONG-TERM CURRENT USE OF INSULIN (HCC): ICD-10-CM

## 2024-06-12 DIAGNOSIS — G47.33 OBSTRUCTIVE SLEEP APNEA: Primary | ICD-10-CM

## 2024-06-12 DIAGNOSIS — E11.10 METABOLIC ACIDOSIS DUE TO DIABETES MELLITUS (HCC): ICD-10-CM

## 2024-06-12 DIAGNOSIS — Z51.81 THERAPEUTIC DRUG MONITORING: Primary | ICD-10-CM

## 2024-06-12 DIAGNOSIS — E66.01 OBESITY, MORBID, BMI 40.0-49.9 (HCC): ICD-10-CM

## 2024-06-12 DIAGNOSIS — Z79.4 TYPE 2 DIABETES MELLITUS WITHOUT COMPLICATION, WITH LONG-TERM CURRENT USE OF INSULIN (HCC): ICD-10-CM

## 2024-06-12 DIAGNOSIS — E78.2 MIXED HYPERLIPIDEMIA: ICD-10-CM

## 2024-06-12 PROBLEM — E66.812 CLASS 2 OBESITY DUE TO EXCESS CALORIES WITHOUT SERIOUS COMORBIDITY WITH BODY MASS INDEX (BMI) OF 39.0 TO 39.9 IN ADULT: Status: ACTIVE | Noted: 2024-06-12

## 2024-06-12 PROCEDURE — 3074F SYST BP LT 130 MM HG: CPT | Performed by: NURSE PRACTITIONER

## 2024-06-12 PROCEDURE — 3008F BODY MASS INDEX DOCD: CPT | Performed by: NURSE PRACTITIONER

## 2024-06-12 PROCEDURE — 3079F DIAST BP 80-89 MM HG: CPT | Performed by: NURSE PRACTITIONER

## 2024-06-12 PROCEDURE — 99204 OFFICE O/P NEW MOD 45 MIN: CPT | Performed by: OTHER

## 2024-06-12 PROCEDURE — 99214 OFFICE O/P EST MOD 30 MIN: CPT | Performed by: NURSE PRACTITIONER

## 2024-06-12 NOTE — PROGRESS NOTES
HISTORY OF PRESENT ILLNESS  Chief Complaint   Patient presents with    Weight Check     -3     Torin Estrada is a 38 year old male here for follow up with medical weight loss program for the treatment of overweight, obesity, or morbid obesity.     Down 3 lbs (last appt was 12/2023)  Compliant on ozempic 1mg weekly, phentermine 37.5mg, jardiance 10mg  Tolerating well, helping with decreasing appetite and no side effects     Over the month, hasn't been able to exercise (due to long work hours)   Success: fewer cravings for sweets  Challenging: food choices during vacation   Exercise/Activity: 3x/ week, via walking, not doing anything routine as far as exercise  Nutrition: eating regular meals, +protein, minimal veggies. not tracking reports  Meals out per week on average: 4 (just the past month, normally less)   Stress is manageable   Sleep: 7 hours/night, waking up feeling rested    Denies chest pain, shortness of breath, dizziness, blurred vision, headache, paresthesia, nausea/vomiting.     Breakfast Lunch Dinner Snacks Fluids   Reviewed              Wt Readings from Last 6 Encounters:   06/12/24 277 lb (125.6 kg)   05/30/24 265 lb (120.2 kg)   02/07/24 270 lb 6.4 oz (122.7 kg)   02/01/24 276 lb 3.2 oz (125.3 kg)   12/11/23 280 lb (127 kg)   10/18/23 290 lb (131.5 kg)          REVIEW OF SYSTEMS  GENERAL: feels well otherwise, denied any fevers chills or night sweats   LUNGS: denies shortness of breath  CARDIOVASCULAR: denies chest pain  GI: denies abdominal pain  MUSCULOSKELETAL: denies back pain, joint pains   PSYCH: denies change in behavior or mood, denies feeling sad or depressed    EXAM  /82   Pulse 106   Resp 16   Ht 5' 10\" (1.778 m)   Wt 277 lb (125.6 kg)   BMI 39.75 kg/m²       GENERAL: well developed, well nourished, in no apparent distress, A/O x3  SKIN: no rashes, no suspicious lesions  HEENT: atraumatic, normocephalic, OP-clear, PERRLA  NECK: supple, no adenopathy  LUNGS: CTA in all fields,  breathing non labored  CARDIO: RRR without murmur  GI: +BS, NT/ND, no masses or HSM  EXTREMITIES: no cyanosis, no clubbing, no edema    Lab Results   Component Value Date    GLU 95 02/09/2024    BUN 14 02/09/2024    BUNCREA 16.0 12/11/2020    CREATSERUM 0.93 02/09/2024    ANIONGAP 3 02/09/2024    GFRNAA 98 12/11/2020    GFRAA 113 12/11/2020    CA 9.5 02/09/2024    OSMOCALC 286 02/09/2024    ALKPHO 47 02/09/2024    AST 21 02/09/2024    ALT 37 02/09/2024    BILT 0.8 02/09/2024    TP 8.1 02/09/2024    ALB 4.1 02/09/2024    GLOBULIN 4.0 02/09/2024     02/09/2024    K 3.9 02/09/2024     02/09/2024    CO2 28.0 02/09/2024     Lab Results   Component Value Date     02/09/2024    A1C 5.7 (H) 02/09/2024     Lab Results   Component Value Date    CHOLEST 215 (H) 02/09/2024    TRIG 101 02/09/2024    HDL 42 02/09/2024     (H) 02/09/2024    VLDL 19 02/09/2024    NONHDLC 173 (H) 02/09/2024     Lab Results   Component Value Date    B12 475 02/09/2024    VITB12 461 07/22/2020     Lab Results   Component Value Date    VITD 22.8 (L) 02/09/2024       Current Outpatient Medications on File Prior to Visit   Medication Sig Dispense Refill    sucralfate 1 g Oral Tab Take 1 tablet (1 g total) by mouth as needed.      semaglutide (OZEMPIC, 1 MG/DOSE,) 4 MG/3ML Subcutaneous Solution Pen-injector INJECT 1MG INTO THE SKIN ONCE A WEEK 9 mL 0    PHENTERMINE HCL 37.5 MG Oral Tab TAKE 1 TABLET BY MOUTH EVERY MORNING BEFORE BREAKFAST 30 tablet 2    JARDIANCE 10 MG Oral Tab TAKE 1 TABLET BY MOUTH EVERY DAY 90 tablet 0    pantoprazole 40 MG Oral Tab EC Take 1 tablet (40 mg total) by mouth every morning before breakfast. 30 tablet 0     No current facility-administered medications on file prior to visit.       ASSESSMENT/PLAN    ICD-10-CM    1. Therapeutic drug monitoring  Z51.81       2. Obesity, morbid, BMI 40.0-49.9 (HCC)  E66.01       3. Vitamin D deficiency  E55.9       4. Type 2 diabetes mellitus without complication,  with long-term current use of insulin (HCC)  E11.9     Z79.4       5. Mixed hyperlipidemia  E78.2           PLAN   Initial Weight Data and Goal Weight Loss:  Initial consult: #295 lbs on 6/2020  Weight Calculations  Initial Weight: 295 lbs  Initial Weight Date: 06/01/20  Today's Weight: 277 lbs  5% Goal: 14.75  10% Goal: 29.5  Total Weight Loss: 18 lbs  Total weight loss: Down 3 lbs total, Net loss 18 lbs  Continue with medications: jardance 10mg  Will increase medications: ozempic 2mg  Will continue medications: phentermine 37.5mg   --advised of side effects and adverse effects of this medication  Contradictions: had previously tried topamax, rybelsus (not covered), diethylpropion (not working)     Reviewed labs  Continue with vitamin d (high dose)   HLD  uncontrolled- (elevated total, LDL) diet controlled, continue to follow-up with PCP  Hx of DM type 2, reviewed last a1c 5.7% on 2/2024  Wrote out macros and encouraged tracking   Encouraged physical activity and exercise   Nutrition: Low carb diet, recommended to eat breakfast daily/ regular protein intake  Follow up with dietitian and psychologist as recommended.  Discussed the role of sleep and stress in weight management.  Counseled on comprehensive weight loss plan including attention to nutrition, exercise and behavior/stress management for success. See patient instruction below for more details.  Discussed strategies to overcome barriers to successful weight loss and weight maintenance  FITTE: ACSM recommendations (150-300 minutes/ week in active weight loss)   Weight Loss Consent to treat reviewed and signed.    Total time spent on chart review, pre-charting, obtaining history, counseling, and educating, reviewing labs was 30 minutes.       NOTE TO PATIENT: The 21st Century Cures Act makes clinical notes like these available to patients in the interest of transparency. Clinical notes are medical documents used by physicians and care providers to communicate  with each other. These documents include medical language and terminology, abbreviations, and treatment information that may sound technical and at times possibly unfamiliar. In addition, at times, the verbiage may appear blunt or direct. These documents are one tool providers use to communicate relevant information and clinical opinions of the care providers in a way that allows common understanding of the clinical context.     There are no Patient Instructions on file for this visit.    No follow-ups on file.    Patient verbalizes understanding.    Gladys Garces, APRN

## 2024-06-12 NOTE — PATIENT INSTRUCTIONS
ENT Dr. Adarsh Quevedo, or any other ENT    Address: [address] [place] [address]    Phone: [phone]

## 2024-06-12 NOTE — PROGRESS NOTES
[facility] General Pulmonary Progress Note    History of Present Illness:  Torin Estrada is a 38 year old male with snoring that is worsening, over the past 3 years, pauses, he does wakes 1-2 times through the night.  He does report daytime fatigue.  Retires to bed 1030/11pm, awake by 7am,   No daytime naps   Sleeps on sides and stomach  No leg cramps no RLS  Occ headaches  Some sinus problems  Acid reflux    12/17/2023 dx  Respiratory Analysis: The Apnea-Hypopnea Index (AHI) was 59.0 events per hour. REM related AHI was 84.4 events per hour. Supine related AHI was 91.8. Lateral related AHI was 47.1. The Central Apnea Index was 1.1. The oxygen saturation willy was 67% and patient spent 10.2% with saturations less than 88%.     DM   Obesity lost 40 pounds      Past Medical History:   Past Medical History:    Anxiety    Decorative tattoo    Diabetes (HCC)    Diabetes mellitus (HCC)    Diarrhea, unspecified    Occasional    Morbid obesity (HCC)    Sleep apnea    Vomiting    Occasional    Wears glasses        Past Surgical History: History reviewed. No pertinent surgical history.      Family Medical History:   Family History   Problem Relation Age of Onset    Diabetes Maternal Grandfather     Diabetes Paternal Grandmother         Social History:   Social History     Socioeconomic History    Marital status: Single     Spouse name: Not on file    Number of children: Not on file    Years of education: Not on file    Highest education level: Not on file   Occupational History    Not on file   Tobacco Use    Smoking status: Never     Passive exposure: Never    Smokeless tobacco: Never   Vaping Use    Vaping status: Never Used   Substance and Sexual Activity    Alcohol use: Not Currently    Drug use: Yes    Sexual activity: Not on file   Other Topics Concern    Caffeine Concern Yes     Comment: coffee    Exercise Yes     Comment: 3x week    Seat Belt Not Asked    Special Diet Not Asked    Stress Concern Not Asked    Weight Concern  Not Asked   Social History Narrative    Not on file     Social Determinants of Health     Financial Resource Strain: Not on file   Food Insecurity: Not on file   Transportation Needs: Not on file   Physical Activity: Not on file   Stress: Not on file   Social Connections: Not on file   Housing Stability: Not on file        Medications:   Current Outpatient Medications   Medication Sig Dispense Refill    semaglutide 8 MG/3ML Subcutaneous Solution Pen-injector Inject 2 mg into the skin once a week. 3 mL 2    sucralfate 1 g Oral Tab Take 1 tablet (1 g total) by mouth as needed.      PHENTERMINE HCL 37.5 MG Oral Tab TAKE 1 TABLET BY MOUTH EVERY MORNING BEFORE BREAKFAST 30 tablet 2    JARDIANCE 10 MG Oral Tab TAKE 1 TABLET BY MOUTH EVERY DAY 90 tablet 0    pantoprazole 40 MG Oral Tab EC Take 1 tablet (40 mg total) by mouth every morning before breakfast. 30 tablet 0       Review of Systems: Review of Systems     Physical Exam:  There were no vitals taken for this visit.       Constitutional: alert, cooperative. No acute distress.  HEENT: Head NC/AT. Mask in place    Results:  Personally reviewed      Assessment/Plan:  1. Obstructive sleep apnea  The pathophysiology and mechanisms of obstructive sleep apnea were explained.  Adverse health consequences seen in association with CARLOS ENRIQUE include increased risk of cardiovascular events , cerebrovascular events, hypertension,  diabetes. Treatment options were discussed.  Positive airway pressure therapy is the gold standard.  Other options include mandibular advancement devices, evaluation of the upper airway by ear nose throat specialist, inspire therapy, and weight loss to be used in conjunction.     ENT evaluation  APAP trial 5-15cwp  Follwup 31-90 days  2. Class 2 obesity due to excess calories without serious comorbidity with body mass index (BMI) of 39.0 to 39.9 in adult    3. Metabolic acidosis due to diabetes mellitus (HCC)        Nydia Mcclain DO  6/12/2024    This visit  is conducted using Telemedicine with live, interactive video and audio.    Patient has been referred to the [facility] website at [email or web address] to review the yearly Consent to Treat document.    Patient understands and accepts financial responsibility for any deductible, co-insurance and/or co-pays associated with this service.

## 2024-06-12 NOTE — PATIENT INSTRUCTIONS
Next steps:  1.  Fill your prescribed medication and take as discussed and prescribed: ozempic 2mg weekly, and phentermine and jaradance   2.  Schedule a personal nutrition consultation with one of our registered dieticians     Please try to work on the following dietary changes:  Daily protein recommendation to start:  grams  Daily carbohydrate: <180g  Daily calories: 2,100-2,200  1.  Drink water with meals and throughout the day, cut down on soda and/or juice if consumed. Consider flavored water options like Bubbly, Spindrift, Hint and Fbaian.  2.  Eat breakfast daily and focus on having protein with each meal, examples include: greek yogurt, cottage cheese, hard boiled egg, whole grain toast with peanut butter.   3.  Reduce refined carbohydrates and sugars which includes items such as sweets, as well as rice, pasta, and bread and make sure to choose whole grain options when having them with just 1 serving per meal about the size of your inner palm.  4.  Consume non starchy veggies daily working towards making them a good 50% of your daily food intake. Add them to lunch and dinner consistently.  5.  Start a daily probiotic: VSL#3 is recommended, (order on line at www.vsl3.com). Take 1 capsule daily with water for 30 days, then reduce to 1 every other day (this will reduce the cost). Capsules can be left out for 2 weeks, but then must be refrigerated.      Please download mary My Fitness Pal, LoseIt! Or Net Diary to monitor daily dietary intake and you will be able to see if you are eating the right amount of calories or too much or too little which would hinder weight loss. Additionally this will help to see your daily carbohydrate and protein intake. When you set the mary up choose 1-2 lbs/week as a goal.  Keeping a paper food journal is an option as well to remain accountable for your choices- this is the start to mindful eating! A low calorie diet has been consistently shown to support weight loss.      Continue or start exercising to help establish a routine. If not already exercising begin with 1 day and progress as able with long-term goal of 30 minutes 5 days a week at a minimum.     Meditation daily can help manage and control stress. Chronic stress can make weight loss difficult.  Exercising is one way to help with stress, but meditation using the CALM George or another comparable alternative can be done in your home or place of work with little time commitment. This George can also help work on behavior change and improve sleep. Check out the segment under Calm Masterclass and listen to The 4 Pillars of Health. A great way to begin learning about the foundation of lifestyle with practical tips to use in your every day.     Check out www.yourweightmatters.org blog for continued daily support and education along this weight loss journey!    Patient Resources:     Personal Training/Fitness Classes/Health Coaching     [facility] @ [email or web address]-driven/[email or web address]-center Full fitness center with group fitness and personal training. Discount available as client of [facility].  Health Coaching and Personal Training with Jeni Jose at our [facility]- individual weekly coaching with option to add personal training and small group fitness classes targeted at weight loss- [phone] and/or email @ [email or web address]  360FIT [place] http://www.Shakti Technology [email or web address]. Group Fitness [phone] and/or email Jaylene at [email or web address]  [facility] Fitness Centers with multiple locations: expresscoin (www.DDRdrive), Eat The Frog Fitness (www.CV-Sight.StudyBlue), Fit Body Bootcamp (www.Vizu Corporationbodybootcamp.StudyBlue), Rovio Entertainment Fitness (www.TicketForEvent.StudyBlue), The Exercise  (www.exercisecoach.StudyBlue)     Online Fitness  Fitness  on import2  Fit in 10 DVD series- www.hmhoc04RMK.com  Sit and Be Fit - Chair  exercise series Www.sitandbefit.org  Hip Hop Fit with Brown Laurent at www.hiphopfit.net     Apps for on the Go Fitness  [place] 7 Minute Workout (orange box with white 7) - free on the go HIIT training george  Peloton George @ www.onepeloton.com     Nutrition Trackers and Tools  LoseIT! And My Fitness Pal apps and on line for tracking nutrition  NOOM - virtual health coaching  FitFoundation (healthy meals on the go) in [place] @ www.yyqvllgmjvthq8zC9 Media  Julius WOODARD @ wwwAtlas Guidesbistromd.com and Ersubd01 (keto and low carb plans recommended) @ wwwAtlas Guidesvykjuh29.com, Metabolic Meals @ www.MyMetabolicMeals.PokitDok - individual prepared meals to go  Gobble, Blue Apron, Home , Every Plate, Sunbasket- on line meal delivery programs for preparation at home  Meal Village in [place] for homemade meals to go @ www.mealHypiosage.PokitDok  Diet Doctor @ www.dietdoctor.com - low carb swaps  YuThe Key Revolution - meal prep and planning george (www.yummly.com)     Stress Management/Behavior/Mindful Eating  CALM meditation george (www.calm.com)  Headspace  Am I Hungry? Mindful eating virtual  george  Www.yourweightmatters.org - Obesity Action Coalition sponsored Blog posts daily  Motivation george (black box with white \")- daily supportive messages sent to your phone     Books/Video Education/Podcasts  Mindless Eating by Harry Anderson  Why We Get Sick by Macario Banks (a book about insulin resistance)  Atomic Habits by Lavell Taavrez (a book about taking small steps to promote greater behavior change)   Can't Hurt Me by Radu Ochoa (a book exploring the power of discipline in achieving your goals)  The End of Dieting: How to Live for Life by Dr. Aidan Dey M.D. or listen to The Colored Solar Podcast Episode 63: Understanding \"Nutritarian\" Eating w/Dr. Aidan Dey  Your Body in Balance: The New Science of Food, Hormones, and Health by Dr. Roddy Gomez  The Menopause Diet Plan by Cher Howard and Ruma Khan  The Complete Guide to fasting by Dr. Stephens  Sugar, Salt &  Fat by Nydia Cortez, Ph.D, R.D.  Weight Loss Surgery Will Not Treat Food Addiction by Kath Gordon Ph.D  The Game Changers- Everestix Documentary on plant based nutrition  Fed Up - documentary about obesity (Free on Utube)  The Truth About Sugar - documentary on sugar (Free on Utube, https://youtu.be/6Q4ljgbOI1l)  The Dr. Goff T5 Wellness Plan by Dr. Andrea Goff MD  Fitlosophy Fitspiration - journal to better health (found at [employer] in fitness aisle)  What Happened to You?- a look at the impact trauma has on behavior written by Vesta Carney and Dr. Laureano Jones  Whole Again by Kurtis August - discovering your true self after trauma  Fidencio Griffin talk on Noster Mobile, The Call to Courage  Podcasts: The Exam Room by the Physician's Committee, Nutrition Facts by Dr. Ann    We are here to support you with weight loss, but please remember that you still need your primary care provider for your routine health maintenance.

## 2024-06-17 ENCOUNTER — TELEPHONE (OUTPATIENT)
Facility: CLINIC | Age: 39
End: 2024-06-17

## 2024-06-19 ENCOUNTER — TELEPHONE (OUTPATIENT)
Dept: INTERNAL MEDICINE CLINIC | Facility: CLINIC | Age: 39
End: 2024-06-19

## 2024-06-19 NOTE — TELEPHONE ENCOUNTER
Approved    Prior authorization approved  Payer: [facility] Yee Case ID: [id number]    [phone]    [phone]  Note from payer: Your PA request has been approved.  Additional information will be provided in the approval communication. (Message 1141)  Approval Details    Authorized from June 19, 2024 to June 19, 2025  Electronic appeal: Not supported  View History  Medication Being Authorized    semaglutide 8 MG/3ML Subcutaneous Solution Pen-injector  Inject 2 mg into the skin once a week.  Dispense: 3 mL Refills: 2   Start: 6/12/2024   Class: Normal Diagnoses: Therapeutic drug monitoring; Obesity, morbid, BMI 40.0-49.9 (HCC); Type 2 diabetes mellitus without complication, with long-term current use of insulin (HCC)   This order has been released to its destination.  To be filled at: [facility]/pharmacy #[id number] - [place], [place] - [address] [phone], [phone]

## 2024-06-21 PROBLEM — D12.8 BENIGN NEOPLASM OF RECTUM: Status: ACTIVE | Noted: 2024-06-21

## 2024-06-21 PROBLEM — R19.7 DIARRHEA, UNSPECIFIED: Status: ACTIVE | Noted: 2024-06-21

## 2024-07-10 DIAGNOSIS — Z51.81 THERAPEUTIC DRUG MONITORING: ICD-10-CM

## 2024-07-10 DIAGNOSIS — Z79.4 TYPE 2 DIABETES MELLITUS WITHOUT COMPLICATION, WITH LONG-TERM CURRENT USE OF INSULIN (HCC): ICD-10-CM

## 2024-07-10 DIAGNOSIS — E11.9 TYPE 2 DIABETES MELLITUS WITHOUT COMPLICATION, WITH LONG-TERM CURRENT USE OF INSULIN (HCC): ICD-10-CM

## 2024-07-10 RX ORDER — EMPAGLIFLOZIN 10 MG/1
10 TABLET, FILM COATED ORAL DAILY
Qty: 90 TABLET | Refills: 0 | Status: SHIPPED | OUTPATIENT
Start: 2024-07-10

## 2024-07-10 NOTE — TELEPHONE ENCOUNTER
Requesting   Requested Prescriptions     Pending Prescriptions Disp Refills    JARDIANCE 10 MG Oral Tab [Pharmacy Med Name: JARDIANCE 10 MG TABLET] 90 tablet 0     Sig: TAKE 1 TABLET BY MOUTH EVERY DAY       LOV: 06/12/2024  RTC: in about 5 months  Filled: 03/06/2024 #90 with 0 refills    Future Appointments   Date Time Provider Department Center   9/19/2024  3:20 PM Guillermo Kruger MD [facility]

## 2024-08-18 DIAGNOSIS — Z51.81 THERAPEUTIC DRUG MONITORING: ICD-10-CM

## 2024-08-18 DIAGNOSIS — E11.9 TYPE 2 DIABETES MELLITUS WITHOUT COMPLICATION, WITH LONG-TERM CURRENT USE OF INSULIN (HCC): ICD-10-CM

## 2024-08-18 DIAGNOSIS — Z79.4 TYPE 2 DIABETES MELLITUS WITHOUT COMPLICATION, WITH LONG-TERM CURRENT USE OF INSULIN (HCC): ICD-10-CM

## 2024-08-19 RX ORDER — PHENTERMINE HYDROCHLORIDE 37.5 MG/1
37.5 TABLET ORAL
Qty: 90 TABLET | Refills: 0 | Status: SHIPPED | OUTPATIENT
Start: 2024-08-19

## 2024-08-19 NOTE — TELEPHONE ENCOUNTER
Last OFFICE VISIT: 6-- AF    Per last note:    Will continue medications: phentermine 37.5mg      RTC: 5 months

## 2024-10-08 DIAGNOSIS — E66.01 OBESITY, MORBID, BMI 40.0-49.9 (HCC): ICD-10-CM

## 2024-10-08 DIAGNOSIS — Z51.81 THERAPEUTIC DRUG MONITORING: ICD-10-CM

## 2024-10-08 DIAGNOSIS — E11.9 TYPE 2 DIABETES MELLITUS WITHOUT COMPLICATION, WITH LONG-TERM CURRENT USE OF INSULIN (HCC): ICD-10-CM

## 2024-10-08 DIAGNOSIS — Z79.4 TYPE 2 DIABETES MELLITUS WITHOUT COMPLICATION, WITH LONG-TERM CURRENT USE OF INSULIN (HCC): ICD-10-CM

## 2024-10-08 RX ORDER — SEMAGLUTIDE 2.68 MG/ML
2 INJECTION, SOLUTION SUBCUTANEOUS WEEKLY
Qty: 3 ML | Refills: 2 | Status: SHIPPED | OUTPATIENT
Start: 2024-10-08

## 2024-10-08 NOTE — TELEPHONE ENCOUNTER
Requesting   Requested Prescriptions     Pending Prescriptions Disp Refills    OZEMPIC, 2 MG/DOSE, 8 MG/3ML Subcutaneous Solution Pen-injector [Pharmacy Med Name: OZEMPIC 8 MG/3 ML (2 MG/DOSE)]  2     Sig: INJECT 2 MG INTO THE SKIN ONCE A WEEK.       LOV: 06/12/2024  RTC: Return in about 5 months   Filled:  06/12/2024 3mL with 2 refills    Future Appointments   Date Time Provider Department Center   10/29/2024  9:00 AM Guillermo Kruger MD [facility]     No Follow ups- Did Message patient to make a follow-up

## 2024-11-13 ENCOUNTER — OFFICE VISIT (OUTPATIENT)
Dept: INTERNAL MEDICINE CLINIC | Facility: CLINIC | Age: 39
End: 2024-11-13
Payer: COMMERCIAL

## 2024-11-13 ENCOUNTER — PATIENT MESSAGE (OUTPATIENT)
Dept: INTERNAL MEDICINE CLINIC | Facility: CLINIC | Age: 39
End: 2024-11-13

## 2024-11-13 VITALS
WEIGHT: 271 LBS | DIASTOLIC BLOOD PRESSURE: 82 MMHG | BODY MASS INDEX: 38.8 KG/M2 | HEIGHT: 70 IN | RESPIRATION RATE: 18 BRPM | HEART RATE: 85 BPM | SYSTOLIC BLOOD PRESSURE: 120 MMHG

## 2024-11-13 DIAGNOSIS — E78.2 MIXED HYPERLIPIDEMIA: ICD-10-CM

## 2024-11-13 DIAGNOSIS — E11.9 TYPE 2 DIABETES MELLITUS WITHOUT COMPLICATION, WITH LONG-TERM CURRENT USE OF INSULIN (HCC): ICD-10-CM

## 2024-11-13 DIAGNOSIS — Z79.4 TYPE 2 DIABETES MELLITUS WITHOUT COMPLICATION, WITH LONG-TERM CURRENT USE OF INSULIN (HCC): Primary | ICD-10-CM

## 2024-11-13 DIAGNOSIS — Z79.4 TYPE 2 DIABETES MELLITUS WITHOUT COMPLICATION, WITH LONG-TERM CURRENT USE OF INSULIN (HCC): ICD-10-CM

## 2024-11-13 DIAGNOSIS — E55.9 VITAMIN D DEFICIENCY: ICD-10-CM

## 2024-11-13 DIAGNOSIS — R06.83 SNORING: ICD-10-CM

## 2024-11-13 DIAGNOSIS — Z51.81 THERAPEUTIC DRUG MONITORING: Primary | ICD-10-CM

## 2024-11-13 DIAGNOSIS — E66.01 OBESITY, MORBID, BMI 40.0-49.9 (HCC): ICD-10-CM

## 2024-11-13 DIAGNOSIS — E11.9 TYPE 2 DIABETES MELLITUS WITHOUT COMPLICATION, WITH LONG-TERM CURRENT USE OF INSULIN (HCC): Primary | ICD-10-CM

## 2024-11-13 PROCEDURE — 3079F DIAST BP 80-89 MM HG: CPT | Performed by: NURSE PRACTITIONER

## 2024-11-13 PROCEDURE — 99214 OFFICE O/P EST MOD 30 MIN: CPT | Performed by: NURSE PRACTITIONER

## 2024-11-13 PROCEDURE — 3008F BODY MASS INDEX DOCD: CPT | Performed by: NURSE PRACTITIONER

## 2024-11-13 PROCEDURE — 3074F SYST BP LT 130 MM HG: CPT | Performed by: NURSE PRACTITIONER

## 2024-11-13 RX ORDER — PHENTERMINE HYDROCHLORIDE 37.5 MG/1
37.5 TABLET ORAL
Qty: 90 TABLET | Refills: 1 | Status: SHIPPED | OUTPATIENT
Start: 2024-11-13

## 2024-11-13 NOTE — PATIENT INSTRUCTIONS
Next steps:  1.  Fill your prescribed medication and take as discussed and prescribed: phentermine and jardance  Check to see if mounjaro would be covered by insurance   2.  Schedule a personal nutrition consultation with one of our registered dieticians     Please try to work on the following dietary changes:  Daily protein recommendation to start:  grams  Daily carbohydrate: <180g  Daily calories: 2,000  1.  Drink water with meals and throughout the day, cut down on soda and/or juice if consumed. Consider flavored water options like Bubbly, Spindrift, Hint and Fabian.  2.  Eat breakfast daily and focus on having protein with each meal, examples include: greek yogurt, cottage cheese, hard boiled egg, whole grain toast with peanut butter.   3.  Reduce refined carbohydrates and sugars which includes items such as sweets, as well as rice, pasta, and bread and make sure to choose whole grain options when having them with just 1 serving per meal about the size of your inner palm.  4.  Consume non starchy veggies daily working towards making them a good 50% of your daily food intake. Add them to lunch and dinner consistently.  5.  Start a daily probiotic: VSL#3 is recommended, (order on line at www.vsl3.com). Take 1 capsule daily with water for 30 days, then reduce to 1 every other day (this will reduce the cost). Capsules can be left out for 2 weeks, but then must be refrigerated.      Please download mary My Fitness Pal, LoseIt! Or Net Diary to monitor daily dietary intake and you will be able to see if you are eating the right amount of calories or too much or too little which would hinder weight loss. Additionally this will help to see your daily carbohydrate and protein intake. When you set the mary up choose 1-2 lbs/week as a goal.  Keeping a paper food journal is an option as well to remain accountable for your choices- this is the start to mindful eating! A low calorie diet has been consistently shown to  support weight loss.     Continue or start exercising to help establish a routine. If not already exercising begin with 1 day and progress as able with long-term goal of 30 minutes 5 days a week at a minimum.     Meditation daily can help manage and control stress. Chronic stress can make weight loss difficult.  Exercising is one way to help with stress, but meditation using the CALM George or another comparable alternative can be done in your home or place of work with little time commitment. This George can also help work on behavior change and improve sleep. Check out the segment under Calm Masterclass and listen to The 4 Pillars of Health. A great way to begin learning about the foundation of lifestyle with practical tips to use in your every day.     Check out www.yourweightmatters.org blog for continued daily support and education along this weight loss journey!    Patient Resources:     Personal Training/Fitness Classes/Health Coaching     [facility] @ https://www.eehealth.org/healthy-driven/fitness-center Full fitness center with group fitness and personal training. Discount available as client of [facility].  Health Coaching and Personal Training with Jeni Jose at our [facility]- individual weekly coaching with option to add personal training and small group fitness classes targeted at weight loss- [phone] and/or email @ [email or web address]  360FIT [place] http://www.[email or web address]. Group Fitness [phone] and/or email Jaylene at [email or web address]  [facility] Cent[facility] Fitness Centers with multiple locations: Dotspin (www.Devunity), Eat The Frog Fitness (www.North by South.Virtify), Fit Body Bootcamp (www.Blue Jeans Networkbodybootcamp.com), JoinMe@ Fitness (www.Nepris.Virtify), The Exercise  (www.exercisecoach.Virtify)     Online Fitness  Fitness  on [employer]  Fit in 10 DVD series- www.egvoj38OSL.Virtify  Sit  and Be Fit - Chair exercise series Www.sitandbefit.org  Hip Hop Fit with Brown Laurent at www.hiphopfit.net     Apps for on the Go Fitness  [place] 7 Minute Workout (orange box with white 7) - free on the go HIIT training george  Peloton George @ www.onepeloton.com     Nutrition Trackers and Tools  LoseIT! And My Fitness Pal apps and on line for tracking nutrition  NOOM - virtual health coaching  FitFoundation (healthy meals on the go) in [place] @ www.kpgztevleizeg4aAutoMoneyBack  Julius WOODARD @ wwwRecoversbistromd.com and Hicrij34 (keto and low carb plans recommended) @ www.lhyoga23.com, Metabolic Meals @ www.Best DoctorsMetabolicMeals.Shockwave Medical - individual prepared meals to go  Gobble, Blue Apron, Home , Every Plate, Sunbasket- on line meal delivery programs for preparation at home  Meal Village in [place] for homemade meals to go @ www.mealSangartage.Shockwave Medical  Diet Doctor @ www.dietdoctor.Shockwave Medical - low carb swaps  Yummly - meal prep and planning george (www.yummly.com)     Stress Management/Behavior/Mindful Eating  CALM meditation george (www.calm.com)  Headspace  Am I Hungry? Mindful eating virtual  george  Www.yourweightmatters.org - Obesity Action Coalition sponsored Blog posts daily  Motivation george (black box with white \")- daily supportive messages sent to your phone     Books/Video Education/Podcasts  Mindless Eating by Harry Anderson  Why We Get Sick by Macario Banks (a book about insulin resistance)  Atomic Habits by Lavell Tavarez (a book about taking small steps to promote greater behavior change)   Can't Hurt Me by Radu Ochoa (a book exploring the power of discipline in achieving your goals)  The End of Dieting: How to Live for Life by Dr. Aidan Dey M.D. or listen to The EcoDomus Podcast Episode 63: Understanding \"Nutritarian\" Eating w/Dr. Aidan Dey  Your Body in Balance: The New Science of Food, Hormones, and Health by Dr. Roddy Gomez  The Menopause Diet Plan by Cher Howard and Ruma Khan  The Complete Guide to fasting by   Kat  Sugar, Salt & Fat by Nydia Cortez, Ph.D, R.D.  Weight Loss Surgery Will Not Treat Food Addiction by Kath Gordon Ph.D  The Game Changers- Hello Musicix Documentary on plant based nutrition  Fed Up - documentary about obesity (Free on Utube)  The Truth About Sugar - documentary on sugar (Free on Utube, https://youtu.be/7R9dgezRG4b)  The Dr. Goff T5 Wellness Plan by Dr. Andrea Goff MD  Fitlosophy Fitspiration - journal to better health (found at [employer] in fitness aisle)  What Happened to You?- a look at the impact trauma has on behavior written by Vesta Carney and Dr. Laureano Jones  Whole Again by Kurtis August - discovering your true self after trauma  Fidencio Griffin talk on Enpirion, The Call to Courage  Podcasts: The Exam Room by the Physician's Committee, Nutrition Facts by Dr. Ann    We are here to support you with weight loss, but please remember that you still need your primary care provider for your routine health maintenance.

## 2024-11-13 NOTE — PROGRESS NOTES
HISTORY OF PRESENT ILLNESS  Chief Complaint   Patient presents with    Weight Check     Down 6      Torin JOSEPH Estrada is a 39 year old male here for follow up with medical weight loss program for the treatment of overweight, obesity, or morbid obesity.     Down 6 lbs (f/u from 6/2024)   Compliant on ozempic 2mg weekly, phentermine 37.5mg, jardiance 10mg   Tolerating well, helping with decreasing appetite and no side effects     Success: have maintained weight (not gained)  Challenging: limited weight loss (seem to have plateau)   Trying to do lower carbs   Exercise/Activity: 3x/ week, via walking the dogs (4 times per day), cardio at work (during lunch)   Nutrition: eating regular meals, +protein, minimal veggies. not tracking reports  Meals out per week on average: 3  Stress is manageable   Sleep: 7 hours/night, waking up feeling rested    Denies chest pain, shortness of breath, dizziness, blurred vision, headache, paresthesia, nausea/vomiting.     Breakfast Lunch Dinner Snacks Fluids   Reviewed              Wt Readings from Last 6 Encounters:   11/13/24 271 lb (122.9 kg)   06/12/24 277 lb (125.6 kg)   05/30/24 265 lb (120.2 kg)   02/07/24 270 lb 6.4 oz (122.7 kg)   02/01/24 276 lb 3.2 oz (125.3 kg)   12/11/23 280 lb (127 kg)          REVIEW OF SYSTEMS  GENERAL: feels well otherwise, denied any fevers chills or night sweats   LUNGS: denies shortness of breath  CARDIOVASCULAR: denies chest pain  GI: denies abdominal pain  MUSCULOSKELETAL: denies back pain, joint pains   PSYCH: denies change in behavior or mood, denies feeling sad or depressed    EXAM  /82   Pulse 85   Resp 18   Ht 5' 10\" (1.778 m)   Wt 271 lb (122.9 kg)   BMI 38.88 kg/m²       GENERAL: well developed, well nourished, in no apparent distress, A/O x3  SKIN: no rashes, no suspicious lesions  HEENT: atraumatic, normocephalic, OP-clear, PERRLA  NECK: supple, no adenopathy  LUNGS: CTA in all fields, breathing non labored  CARDIO: RRR without  murmur  GI: +BS, NT/ND, no masses or HSM  EXTREMITIES: no cyanosis, no clubbing, no edema    Lab Results   Component Value Date    GLU 95 02/09/2024    BUN 14 02/09/2024    BUNCREA 16.0 12/11/2020    CREATSERUM 0.93 02/09/2024    ANIONGAP 3 02/09/2024    GFRNAA 98 12/11/2020    GFRAA 113 12/11/2020    CA 9.5 02/09/2024    OSMOCALC 286 02/09/2024    ALKPHO 47 02/09/2024    AST 21 02/09/2024    ALT 37 02/09/2024    BILT 0.8 02/09/2024    TP 8.1 02/09/2024    ALB 4.1 02/09/2024    GLOBULIN 4.0 02/09/2024     02/09/2024    K 3.9 02/09/2024     02/09/2024    CO2 28.0 02/09/2024     Lab Results   Component Value Date     02/09/2024    A1C 5.7 (H) 02/09/2024     Lab Results   Component Value Date    CHOLEST 215 (H) 02/09/2024    TRIG 101 02/09/2024    HDL 42 02/09/2024     (H) 02/09/2024    VLDL 19 02/09/2024    NONHDLC 173 (H) 02/09/2024     Lab Results   Component Value Date    B12 475 02/09/2024    VITB12 461 07/22/2020     Lab Results   Component Value Date    VITD 22.8 (L) 02/09/2024       Medications Ordered Prior to Encounter[1]    ASSESSMENT/PLAN    ICD-10-CM    1. Therapeutic drug monitoring  Z51.81       2. Obesity, morbid, BMI 40.0-49.9 (HCC)  E66.01       3. Type 2 diabetes mellitus without complication, with long-term current use of insulin (HCC)  E11.9     Z79.4       4. Mixed hyperlipidemia  E78.2       5. Snoring  R06.83       6. Vitamin D deficiency  E55.9           PLAN   Initial Weight Data and Goal Weight Loss:  Initial consult: #295 lbs on 6/2020  Weight Calculations  Initial Weight: 295 lbs  Initial Weight Date: 06/01/20  Today's Weight: 271 lbs  5% Goal: 14.75  10% Goal: 29.5  Total Weight Loss: 24 lbs  Total weight loss: Down 6 lbs total, Net loss 24 lbs  Continue with medications: jardance 10mg  Will increase medications: ozempic 2mg (is going to check with insurance if mounjaro would be covered and if so- will switch)  Will continue medications: phentermine 37.5mg    --advised of side effects and adverse effects of this medication  Contradictions: had previously tried topamax, rybelsus (not covered), diethylpropion (not working)     Reviewed labs  Continue with vitamin d (high dose)   HLD  uncontrolled- (elevated total, LDL) diet controlled, continue to follow-up with PCP  Hx of DM type 2, reviewed last a1c 5.7% on 2/2024- on ozempic  Wrote out macros and encouraged tracking   Encouraged to add in strength training   Nutrition: Low carb diet, recommended to eat breakfast daily/ regular protein intake  Follow up with dietitian and psychologist as recommended.  Discussed the role of sleep and stress in weight management.  Counseled on comprehensive weight loss plan including attention to nutrition, exercise and behavior/stress management for success. See patient instruction below for more details.  Discussed strategies to overcome barriers to successful weight loss and weight maintenance  FITTE: ACSM recommendations (150-300 minutes/ week in active weight loss)   Weight Loss Consent to treat reviewed and signed.    Total time spent on chart review, pre-charting, obtaining history, counseling, and educating, reviewing labs was 30 minutes.       NOTE TO PATIENT: The 21st Century Cures Act makes clinical notes like these available to patients in the interest of transparency. Clinical notes are medical documents used by physicians and care providers to communicate with each other. These documents include medical language and terminology, abbreviations, and treatment information that may sound technical and at times possibly unfamiliar. In addition, at times, the verbiage may appear blunt or direct. These documents are one tool providers use to communicate relevant information and clinical opinions of the care providers in a way that allows common understanding of the clinical context.     There are no Patient Instructions on file for this visit.    No follow-ups on file.    Patient  verbalizes understanding.    JANICE Cintron             [1]   Current Outpatient Medications on File Prior to Visit   Medication Sig Dispense Refill    semaglutide (OZEMPIC, 2 MG/DOSE,) 8 MG/3ML Subcutaneous Solution Pen-injector INJECT 2 MG INTO THE SKIN ONCE A WEEK. 3 mL 2    DICYCLOMINE 10 MG Oral Cap TAKE 1 CAPSULE BY MOUTH 3 TIMES DAILY AS NEEDED. 90 capsule 0    Phentermine HCl 37.5 MG Oral Tab TAKE 1 TABLET BY MOUTH EVERY DAY BEFORE BREAKFAST 90 tablet 0    JARDIANCE 10 MG Oral Tab TAKE 1 TABLET BY MOUTH EVERY DAY 90 tablet 0    sucralfate 1 g Oral Tab Take 1 tablet (1 g total) by mouth as needed.      pantoprazole 40 MG Oral Tab EC Take 1 tablet (40 mg total) by mouth every morning before breakfast. 30 tablet 0     No current facility-administered medications on file prior to visit.

## 2024-11-14 DIAGNOSIS — Z51.81 THERAPEUTIC DRUG MONITORING: ICD-10-CM

## 2024-11-14 DIAGNOSIS — Z79.4 TYPE 2 DIABETES MELLITUS WITHOUT COMPLICATION, WITH LONG-TERM CURRENT USE OF INSULIN (HCC): ICD-10-CM

## 2024-11-14 DIAGNOSIS — E11.9 TYPE 2 DIABETES MELLITUS WITHOUT COMPLICATION, WITH LONG-TERM CURRENT USE OF INSULIN (HCC): ICD-10-CM

## 2024-11-14 RX ORDER — TIRZEPATIDE 7.5 MG/.5ML
7.5 INJECTION, SOLUTION SUBCUTANEOUS WEEKLY
Qty: 2 ML | Refills: 2 | Status: SHIPPED | OUTPATIENT
Start: 2024-11-14

## 2024-11-14 RX ORDER — EMPAGLIFLOZIN 10 MG/1
10 TABLET, FILM COATED ORAL DAILY
Qty: 90 TABLET | Refills: 0 | Status: SHIPPED | OUTPATIENT
Start: 2024-11-14

## 2024-11-14 NOTE — TELEPHONE ENCOUNTER
Requesting   Requested Prescriptions     Pending Prescriptions Disp Refills    JARDIANCE 10 MG Oral Tab [Pharmacy Med Name: JARDIANCE 10 MG TABLET] 90 tablet 0     Sig: TAKE 1 TABLET BY MOUTH EVERY DAY       LOV: 11/13/2024  RTC: in about 6 months  Filled: 07/10/2024 #90 with 0 refills    Future Appointments   Date Time Provider Department Center   3/28/2025  9:20 AM Gladys Garces APRN [facility]   6/30/2025  3:20 PM Gladys Garces APRN [facility]

## 2024-11-24 ENCOUNTER — HOSPITAL ENCOUNTER (OUTPATIENT)
Age: 39
Discharge: HOME | End: 2024-11-24
Payer: COMMERCIAL

## 2024-11-24 VITALS
WEIGHT: 250 LBS | TEMPERATURE: 99 F | DIASTOLIC BLOOD PRESSURE: 95 MMHG | OXYGEN SATURATION: 96 % | SYSTOLIC BLOOD PRESSURE: 122 MMHG | HEIGHT: 70 IN | HEART RATE: 95 BPM | RESPIRATION RATE: 22 BRPM | BODY MASS INDEX: 35.79 KG/M2

## 2024-11-24 DIAGNOSIS — K12.2 UVULITIS: Primary | ICD-10-CM

## 2024-11-24 LAB — S PYO AG THROAT QL: NEGATIVE

## 2024-11-24 RX ORDER — DEXAMETHASONE 4 MG/1
8 TABLET ORAL ONCE
Status: COMPLETED | OUTPATIENT
Start: 2024-11-24 | End: 2024-11-24

## 2024-11-24 NOTE — ED PROVIDER NOTES
Patient Seen in: [facility]      History     Chief Complaint   Patient presents with    Sore Throat     Concern of possible strep throat - Entered by patient    Sore Throat     Stated Complaint: Sore Throat - Concern of possible strep throat    Subjective:   39-year-old male presents for uvula swelling and sore throat. Patient states he noticed some swelling to the back of his throat he does have voice change and pain with swallowing.    Objective:     Past Medical History:    Anxiety    Decorative tattoo    Diabetes (HCC)    Diabetes mellitus (HCC)    Diarrhea, unspecified    Occasional    Morbid obesity (HCC)    Sleep apnea    Vomiting    Occasional    Wears glasses              History reviewed. No pertinent surgical history.             Social History     Socioeconomic History    Marital status: Single   Tobacco Use    Smoking status: Never     Passive exposure: Never    Smokeless tobacco: Never   Vaping Use    Vaping status: Never Used   Substance and Sexual Activity    Alcohol use: Not Currently    Drug use: Yes   Other Topics Concern    Caffeine Concern Yes     Comment: coffee    Exercise Yes     Comment: 3x week              Review of Systems   Constitutional: Negative.    Respiratory: Negative.     Cardiovascular: Negative.    Gastrointestinal: Negative.    Skin: Negative.    Neurological: Negative.        Positive for stated complaint: Sore Throat - Concern of possible strep throat  Other systems are as noted in HPI.  Constitutional and vital signs reviewed.      All other systems reviewed and negative except as noted above.    Physical Exam     ED Triage Vitals [11/24/24 0917]   BP (!) 122/95   Pulse 95   Resp 22   Temp 98.7 °F (37.1 °C)   Temp src Temporal   SpO2 96 %   O2 Device None (Room air)       Current Vitals:   Vital Signs  BP: (!) 122/95  Pulse: 95  Resp: 22  Temp: 98.7 °F (37.1 °C)  Temp src: Temporal    Oxygen Therapy  SpO2: 96 %  O2 Device: None (Room  air)        Physical Exam  Vitals and nursing note reviewed.   Constitutional:       General: He is not in acute distress.  HENT:      Head: Normocephalic.      Right Ear: Tympanic membrane normal.      Left Ear: Tympanic membrane normal.      Mouth/Throat:      Pharynx: Uvula midline. Uvula swelling present.      Tonsils: 0 on the right. 0 on the left.   Cardiovascular:      Rate and Rhythm: Normal rate.   Pulmonary:      Effort: Pulmonary effort is normal.      Breath sounds: Normal breath sounds.   Musculoskeletal:         General: Normal range of motion.   Skin:     General: Skin is warm and dry.   Neurological:      General: No focal deficit present.      Mental Status: He is alert and oriented to person, place, and time.             ED Course     Labs Reviewed   POCT RAPID STREP - Normal      MDM    Medical Decision Making  Pertinent Labs & Imaging studies reviewed. (See chart for details).  Patient coming in with sore throat.   Differential diagnosis includes pharyngitis, uvulitis  Will discharge on Augmentin, Decadron.   Patient is comfortable with this plan.     Overall Pt looks good. Non-toxic, well-hydrated and in no respiratory distress. Vital signs are reassuring. Exam is reassuring. I do not believe pt requires and additional diagnostic studies or intervention. I believe pt can be discharged home to continue evaluation as an outpatient. Follow-up provider given. Discharge instructions given and reviewed. Return for any problems. All understand and agrees with the plan.        Problems Addressed:  Uvulitis: acute illness or injury    Amount and/or Complexity of Data Reviewed  Labs: ordered. Decision-making details documented in ED Course.    Risk  OTC drugs.  Prescription drug management.        Disposition and Plan     Clinical Impression:  1. Uvulitis         Disposition:  Discharge  11/24/2024  9:42 am    Follow-up:  Srinivaas Malhotra MD  [address] [place] [address]  [id number]  [phone]                Medications Prescribed:  Discharge Medication List as of 11/24/2024  9:43 AM        START taking these medications    Details   amoxicillin clavulanate 875-125 MG Oral Tab Take 1 tablet by mouth 2 (two) times daily for 10 days., Normal, Disp-20 tablet, R-0                 Supplementary Documentation:                                                            93

## 2025-02-21 ENCOUNTER — PATIENT MESSAGE (OUTPATIENT)
Dept: INTERNAL MEDICINE CLINIC | Facility: CLINIC | Age: 40
End: 2025-02-21

## 2025-02-21 DIAGNOSIS — E11.9 TYPE 2 DIABETES MELLITUS WITHOUT COMPLICATION, WITH LONG-TERM CURRENT USE OF INSULIN (HCC): Primary | ICD-10-CM

## 2025-02-21 DIAGNOSIS — Z79.4 TYPE 2 DIABETES MELLITUS WITHOUT COMPLICATION, WITH LONG-TERM CURRENT USE OF INSULIN (HCC): Primary | ICD-10-CM

## 2025-02-24 RX ORDER — TIRZEPATIDE 10 MG/.5ML
10 INJECTION, SOLUTION SUBCUTANEOUS WEEKLY
Qty: 2 ML | Refills: 2 | Status: SHIPPED | OUTPATIENT
Start: 2025-02-24

## 2025-02-24 NOTE — TELEPHONE ENCOUNTER
Requesting Mounjaro increase  LOV: 11/13/24  RTC: 6 months  Last Relevant Labs: 2/9/24  Filled: 11/14/24 #2 ml with 2 refills 7.5 mg    Future Appointments   Date Time Provider Department Center   3/28/2025  9:20 AM Gladys aGrces APRN [facility]   6/30/2025  3:20 PM Gladys Garces APRN [facility]     11/13/24 271#  Now 266#

## 2025-03-04 DIAGNOSIS — E11.9 TYPE 2 DIABETES MELLITUS WITHOUT COMPLICATION, WITH LONG-TERM CURRENT USE OF INSULIN (HCC): ICD-10-CM

## 2025-03-04 DIAGNOSIS — Z51.81 THERAPEUTIC DRUG MONITORING: ICD-10-CM

## 2025-03-04 DIAGNOSIS — Z79.4 TYPE 2 DIABETES MELLITUS WITHOUT COMPLICATION, WITH LONG-TERM CURRENT USE OF INSULIN (HCC): ICD-10-CM

## 2025-03-04 RX ORDER — EMPAGLIFLOZIN 10 MG/1
10 TABLET, FILM COATED ORAL DAILY
Qty: 90 TABLET | Refills: 0 | Status: SHIPPED | OUTPATIENT
Start: 2025-03-04

## 2025-03-04 NOTE — TELEPHONE ENCOUNTER
Requesting   Requested Prescriptions     Pending Prescriptions Disp Refills    JARDIANCE 10 MG Oral Tab [Pharmacy Med Name: JARDIANCE 10 MG TABLET] 90 tablet 0     Sig: TAKE 1 TABLET BY MOUTH EVERY DAY      LOV: 11/13/24  RTC: 3-6mo  Last Relevant Labs: 2/9/24  Filled: 11/14/24 #90 with 0 refills    Future Appointments   Date Time Provider Department Center   3/28/2025  9:20 AM Gladys Garces APRN [facility]   6/30/2025  3:20 PM Gladys Garces APRN [facility]

## 2025-04-10 NOTE — PROGRESS NOTES
[facility] WEIGHT MANAGEMENT VIRTUAL ENCOUNTER     Torin Estrada verbally consents to a Virtual/Telephone Check-In service on 04/11/25   Patient understands and accepts financial responsibility for any deductible, co-insurance and/or co-pays associated with this service.    HISTORY OF PRESENT ILLNESS  Chief Complaint   Patient presents with    Other     F/u on weight management      Torin Estrada is a 39 year old male is being evaluated as a video visit using Telemedicine with live, interactive video and audio    Weight gain/loss since LOV based on home monitoring:   Home scale: 267  Has lost  #4 lbs since LOV 5 months ago     Compliance with medication: mounjaro 10mg weekly, phentermine 37.5mg, jardiance 10mg   Tolerating well, helping with decreasing appetite and no side effects     Feeling like he has more energy   Has been trying to get in more vegs   Reduced carbs   Feels like the mounjaro (has less GI side effects than ozempic) but is not seeing a lot of weight loss  Exercise/Activity: 3x/ week, via walking (4 times per day), started weight gain   Nutrition: eating regular meals, +protein, minimal veggies. not tracking reports  Stress is manageable   Sleep: 7-8 hours/night, waking up feeling rested    Denies chest pain, shortness of breath, dizziness, blurred vision, headache, paresthesia, nausea/vomiting.     Wt Readings from Last 6 Encounters:   11/24/24 250 lb (113.4 kg)   11/13/24 271 lb (122.9 kg)   06/12/24 277 lb (125.6 kg)   05/30/24 265 lb (120.2 kg)   02/07/24 270 lb 6.4 oz (122.7 kg)   02/01/24 276 lb 3.2 oz (125.3 kg)          Subjective  REVIEW OF SYSTEMS  GENERAL HEALTH: feels well otherwise, denied any fevers chills or night sweats   RESPIRATORY: denies shortness of breath   CARDIOVASCULAR: denies chest pain  GI: denies abdominal pain  PSYCH: denies any mood changes    Objective  EXAM  Reviewed most recent set of vitals   Physical Exam:  GENERAL: well developed, well nourished, in no  apparent distress, speaking in full sentences comfortably   SKIN: warm, pink, dry without rashes to exposed area   EYES: conjunctiva pink  HEENT: atraumatic, normocephalic  LUNGS: normal work of breathing, non labored  CARDIO: normal work, no exertion  EXTREMITIES: no cyanosis, no clubbing, no edema  NEURO: Oriented times three  PSYCH: pleasant, cooperative, normal mood and affect    Lab Results   Component Value Date    WBC 5.7 02/09/2024    RBC 5.10 02/09/2024    HGB 15.5 02/09/2024    HCT 46.7 02/09/2024    MCV 91.6 02/09/2024    MCH 30.4 02/09/2024    MCHC 33.2 02/09/2024    RDW 13.6 02/09/2024    .0 02/09/2024     Lab Results   Component Value Date    GLU 95 02/09/2024    BUN 14 02/09/2024    BUNCREA 16.0 12/11/2020    CREATSERUM 0.93 02/09/2024    ANIONGAP 3 02/09/2024    GFRNAA 98 12/11/2020    GFRAA 113 12/11/2020    CA 9.5 02/09/2024    OSMOCALC 286 02/09/2024    ALKPHO 47 02/09/2024    AST 21 02/09/2024    ALT 37 02/09/2024    BILT 0.8 02/09/2024    TP 8.1 02/09/2024    ALB 4.1 02/09/2024    GLOBULIN 4.0 02/09/2024     02/09/2024    K 3.9 02/09/2024     02/09/2024    CO2 28.0 02/09/2024     Lab Results   Component Value Date     02/09/2024    A1C 5.7 (H) 02/09/2024     Lab Results   Component Value Date    CHOLEST 215 (H) 02/09/2024    TRIG 101 02/09/2024    HDL 42 02/09/2024     (H) 02/09/2024    VLDL 19 02/09/2024    NONHDLC 173 (H) 02/09/2024     Lab Results   Component Value Date    T4F 1.0 02/09/2024    TSH 1.390 02/09/2024     Lab Results   Component Value Date    B12 475 02/09/2024    VITB12 461 07/22/2020     Lab Results   Component Value Date    VITD 22.8 (L) 02/09/2024       Medications Ordered Prior to Encounter[1]    ASSESSMENT  Analyzed weight data:       Diagnoses and all orders for this visit:    Therapeutic drug monitoring    Obesity, morbid, BMI 40.0-49.9 (HCC)    Type 2 diabetes mellitus without complication, with long-term current use of insulin  (HCC)    Vitamin D deficiency    Snoring    Mixed hyperlipidemia        PLAN  Will increase medications: mounjaro 12.5mg weekly  Continue with medications: jardance 10mg  Will continue medications: phentermine 37.5mg   --advised of side effects and adverse effects of this medication  Contradictions: had previously tried topamax, rybelsus (not covered), diethylpropion (not working)    Reviewed labs  Continue with vitamin d (high dose)   HLD  uncontrolled- (elevated total, LDL) diet controlled, continue to follow-up with PCP  Hx of DM type 2, reviewed last a1c 5.7% on 2/2024- mounjaro   Wrote out macros and encouraged tracking   Encouraged to add in strength training   Advised to monitor blood pressure and pulse at home/ given parameters to review and contact provider.  Nutrition: low carb diet/ recommended to eat breakfast daily/ regular protein intake  Follow up with dietitian and psychologist as recommended.  Discussed the role of sleep and stress in weight management.  Counseled on comprehensive weight loss plan including attention to nutrition, exercise and behavior/stress management for success. See patient instruction below for more details.  Discussed strategies to overcome barriers to successful weight loss and weight maintenance  FITTE: ACSM recommendations (150-300 minutes/ week in active weight loss)       There are no Patient Instructions on file for this visit.    No follow-ups on file.    Patient verbalizes understanding.    Total time spent on chart review, pre-charting, obtaining history, counseling, and educating, reviewing labs was 24 minutes.       Pt understands phone/video evaluation is not a substitute for face to face examination or emergency care. Pt advised to go to the ER or call 911 for worsening symptoms or acute distress.       Please note that the following visit was completed using two-way, real-time interactive audio and/or video communication.  This has been done in good lima to  provide continuity of care in the best interest of the provider-patient relationship, due to the ongoing public health crisis/national emergency and because of restrictions of visitation.  There are limitations of this visit as no physical exam could be performed.  Every conscious effort was taken to allow for sufficient and adequate time.  This billing was spent on reviewing labs, medications, radiology tests and decision making.  Appropriate medical decision-making and tests are ordered as detailed in the plan of care above.     NOTE TO PATIENT: The 21st Century Cures Act makes clinical notes like these available to patients in the interest of transparency. Clinical notes are medical documents used by physicians and care providers to communicate with each other. These documents include medical language and terminology, abbreviations, and treatment information that may sound technical and at times possibly unfamiliar. In addition, at times, the verbiage may appear blunt or direct. These documents are one tool providers use to communicate relevant information and clinical opinions of the care providers in a way that allows common understanding of the clinical context.     Gladys Garces, APRN  4/10/2025         [1]   Current Outpatient Medications on File Prior to Visit   Medication Sig Dispense Refill    JARDIANCE 10 MG Oral Tab TAKE 1 TABLET BY MOUTH EVERY DAY 90 tablet 0    Tirzepatide (MOUNJARO) 10 MG/0.5ML Subcutaneous Solution Auto-injector Inject 10 mg into the skin once a week. 2 mL 2    Tirzepatide (MOUNJARO) 7.5 MG/0.5ML Subcutaneous Solution Auto-injector Inject 7.5 mg into the skin once a week. 2 mL 2    Phentermine HCl 37.5 MG Oral Tab Take 1 tablet (37.5 mg total) by mouth before breakfast. 90 tablet 1    DICYCLOMINE 10 MG Oral Cap TAKE 1 CAPSULE BY MOUTH 3 TIMES DAILY AS NEEDED. 90 capsule 0    sucralfate 1 g Oral Tab Take 1 tablet (1 g total) by mouth as needed.      pantoprazole 40 MG Oral Tab EC  Take 1 tablet (40 mg total) by mouth every morning before breakfast. 30 tablet 0     No current facility-administered medications on file prior to visit.

## 2025-04-11 ENCOUNTER — TELEMEDICINE (OUTPATIENT)
Dept: INTERNAL MEDICINE CLINIC | Facility: CLINIC | Age: 40
End: 2025-04-11
Payer: COMMERCIAL

## 2025-04-11 DIAGNOSIS — E11.9 TYPE 2 DIABETES MELLITUS WITHOUT COMPLICATION, WITH LONG-TERM CURRENT USE OF INSULIN (HCC): ICD-10-CM

## 2025-04-11 DIAGNOSIS — Z79.4 TYPE 2 DIABETES MELLITUS WITHOUT COMPLICATION, WITH LONG-TERM CURRENT USE OF INSULIN (HCC): ICD-10-CM

## 2025-04-11 DIAGNOSIS — E55.9 VITAMIN D DEFICIENCY: ICD-10-CM

## 2025-04-11 DIAGNOSIS — E66.01 OBESITY, MORBID, BMI 40.0-49.9 (HCC): ICD-10-CM

## 2025-04-11 DIAGNOSIS — E78.2 MIXED HYPERLIPIDEMIA: ICD-10-CM

## 2025-04-11 DIAGNOSIS — R06.83 SNORING: ICD-10-CM

## 2025-04-11 DIAGNOSIS — Z51.81 THERAPEUTIC DRUG MONITORING: Primary | ICD-10-CM

## 2025-04-11 RX ORDER — TIRZEPATIDE 12.5 MG/.5ML
12.5 INJECTION, SOLUTION SUBCUTANEOUS WEEKLY
Qty: 2 ML | Refills: 3 | Status: SHIPPED | OUTPATIENT
Start: 2025-04-11

## 2025-04-11 NOTE — PATIENT INSTRUCTIONS
Next steps:  1.  Fill your prescribed medication and take as discussed and prescribed: mounjaro 12.5mg weekly, jardance 10mg, and phentermine   2.  Schedule a personal nutrition consultation with one of our registered dieticians     Please try to work on the following dietary changes:    1.  Drink water with meals and throughout the day, cut down on soda and/or juice if consumed. Consider flavored water options like Bubbly, Spindrift, Hint and Fabian.  2.  Eat breakfast daily and focus on having protein with each meal, examples include: greek yogurt, cottage cheese, hard boiled egg, whole grain toast with peanut butter.   3.  Reduce refined carbohydrates and sugars which includes items such as sweets, as well as rice, pasta, and bread and make sure to choose whole grain options when having them with just 1 serving per meal about the size of your inner palm.  4.  Consume non starchy veggies daily working towards making them a good 50% of your daily food intake. Add them to lunch and dinner consistently.  5.  Start a daily probiotic: VSL#3 is recommended, (order on line at www.vsl3.com). Take 1 capsule daily with water for 30 days, then reduce to 1 every other day (this will reduce the cost). Capsules can be left out for 2 weeks, but then must be refrigerated.      Please download mary My Fitness Pal, LoseIt! Or Net Diary to monitor daily dietary intake and you will be able to see if you are eating the right amount of calories or too much or too little which would hinder weight loss. Additionally this will help to see your daily carbohydrate and protein intake. When you set the mary up choose 1-2 lbs/week as a goal.  Keeping a paper food journal is an option as well to remain accountable for your choices- this is the start to mindful eating! A low calorie diet has been consistently shown to support weight loss.     Continue or start exercising to help establish a routine. If not already exercising begin with 1 day and  progress as able with long-term goal of 30 minutes 5 days a week at a minimum.     Meditation daily can help manage and control stress. Chronic stress can make weight loss difficult.  Exercising is one way to help with stress, but meditation using the CALM George or another comparable alternative can be done in your home or place of work with little time commitment. This George can also help work on behavior change and improve sleep. Check out the segment under Calm Masterclass and listen to The 4 Pillars of Health. A great way to begin learning about the foundation of lifestyle with practical tips to use in your every day.     Check out www.yourweightmatters.org blog for continued daily support and education along this weight loss journey!    Patient Resources:     Personal Training/Fitness Classes/Health Coaching     [facility] @ [email or web address]-center Full fitness center with group fitness and personal training. Discount available as client of [facility].  Health Coaching and Personal Training with Jeni Jose at our [facility]- individual weekly coaching with option to add personal training and small group fitness classes targeted at weight loss- [phone] and/or email @ [email or web address]  [employer] [place] http://www.[employer]. Group Fitness [phone] and/or email Jaylene at [email or web address]  [facility] Fitness Centers with multiple locations: Dojo (www.RIDERS), Eat The BoatSetter Fitness (www.ElementsLocal.EnLink Geoenergy Services), Fit Body Bootcamp (www.WIN Advanced Systemsbodybootcamp.EnLink Geoenergy Services), Human Performance Integrated Systems Fitness (www.Body Central), The Exercise  (www.exercisecoach.EnLink Geoenergy Services)     Online Fitness  Fitness  on Utube  Fit in 10 DVD series- www.liqxv75NIY.com  Sit and Be Fit - Chair exercise series Www.sitandbefit.org  Hip Hop Fit with Brown Laurent at www.hiphopfit.net     Apps for on the Go  Fitness  Nocona 7 Minute Workout (orange box with white 7) - free on the go HIIT training george  Peloton George @ www.onepeloton.com     Nutrition Trackers and Tools  LoseIT! And My Fitness Pal apps and on line for tracking nutrition  NOOM - virtual health coaching  FitFoundation (healthy meals on the go) in [place] @ www.ttxxpjehzpxqe4r.Luxanova  Bistro MD @ www.bistromd.com and Zqpbkl30 (keto and low carb plans recommended) @ www.ouvmum76.com, Metabolic Meals @ www.MyMetabolicMeals.Luxanova - individual prepared meals to go  Gobble, Blue Apron, Home , Every Plate, Sunbasket- on line meal delivery programs for preparation at home  Meal Village in [place] for homemade meals to go @ www.mealvillage.Luxanova  Diet Doctor @ www.dietdoctor.com - low carb swaps  Yumm"MeetMe, Inc." - meal prep and planning george (www.yummly.com)     Stress Management/Behavior/Mindful Eating  CALM meditation george (www.calm.com)  Headspace  Am I Hungry? Mindful eating virtual  george  Www.yourweightmatters.org - Obesity Action Coalition sponsored Blog posts daily  Motivation george (black box with white \")- daily supportive messages sent to your phone     Books/Video Education/Podcasts  Mindless Eating by Harry Anderson  Why We Get Sick by Macario Banks (a book about insulin resistance)  Atomic Habits by Lavell Tavarez (a book about taking small steps to promote greater behavior change)   Can't Hurt Me by Radu Ochoa (a book exploring the power of discipline in achieving your goals)  The End of Dieting: How to Live for Life by Dr. Aidan Dey M.D. or listen to The You.Do Podcast Episode 63: Understanding \"Nutritarian\" Eating w/Dr. Aidan Dey  Your Body in Balance: The New Science of Food, Hormones, and Health by Dr. Roddy Gomez  The Menopause Diet Plan by Cher Howard and Ruma Khan  The Complete Guide to fasting by Dr. Stephens  Sugar, Salt & Fat by Nydia Cortez, Ph.D, R.D.  Weight Loss Surgery Will Not Treat Food Addiction by Kath Gordon  Ph.D  The Game Changers- SimpleRegistry Documentary on plant based nutrition  Fed Up - documentary about obesity (Free on Utube)  The Truth About Sugar - documentary on sugar (Free on Utube, https://youtu.be/0P4salsWI9g)  The Dr. Goff T5 Wellness Plan by Dr. Andrea Goff MD  Fitlosophy Fitspiration - journal to better health (found at [employer] in fitness aisle)  What Happened to You?- a look at the impact trauma has on behavior written by Vesta Carney and Dr. Laureano Jones  Whole Again by Kurtis August - discovering your true self after trauma  Fidencio Griffin talk on SimpleRegistry, The Call to Courage  Podcasts: The Exam Room by the Physician's Committee, Nutrition Facts by Dr. Ann    We are here to support you with weight loss, but please remember that you still need your primary care provider for your routine health maintenance.

## 2025-06-28 DIAGNOSIS — E11.9 TYPE 2 DIABETES MELLITUS WITHOUT COMPLICATION, WITH LONG-TERM CURRENT USE OF INSULIN (HCC): ICD-10-CM

## 2025-06-28 DIAGNOSIS — Z79.4 TYPE 2 DIABETES MELLITUS WITHOUT COMPLICATION, WITH LONG-TERM CURRENT USE OF INSULIN (HCC): ICD-10-CM

## 2025-06-28 DIAGNOSIS — Z51.81 THERAPEUTIC DRUG MONITORING: ICD-10-CM

## 2025-06-30 RX ORDER — EMPAGLIFLOZIN 10 MG/1
10 TABLET, FILM COATED ORAL DAILY
Qty: 90 TABLET | Refills: 1 | Status: SHIPPED | OUTPATIENT
Start: 2025-06-30

## 2025-06-30 NOTE — PLAN OF CARE
Pt has been hemodynamically stable throughout the day. Pt has been drowsy to lethargic but when aroused pt remains oriented. Pt overall weak. Pt remains on insulin gtt; maintaining at DKA base algorithm column 6. Pt remains on IVF.   Continue labs Q4h, Heterosexual

## 2025-06-30 NOTE — TELEPHONE ENCOUNTER
Requesting   Requested Prescriptions     Pending Prescriptions Disp Refills    JARDIANCE 10 MG Oral Tab [Pharmacy Med Name: JARDIANCE 10 MG TABLET] 90 tablet 0     Sig: TAKE 1 TABLET BY MOUTH EVERY DAY       LOV: 4/11/2025 tele  RTC: 11/10/2025  Last Relevant Labs: na  Filled: 3/04/2025 #90 tablet with 0 refills    Future Appointments   Date Time Provider Department Center   11/10/2025  2:20 PM Gladys Garces APRN [facility]

## 2025-07-09 DIAGNOSIS — E11.9 TYPE 2 DIABETES MELLITUS WITHOUT COMPLICATION, WITH LONG-TERM CURRENT USE OF INSULIN (HCC): ICD-10-CM

## 2025-07-09 DIAGNOSIS — Z51.81 THERAPEUTIC DRUG MONITORING: ICD-10-CM

## 2025-07-09 DIAGNOSIS — Z79.4 TYPE 2 DIABETES MELLITUS WITHOUT COMPLICATION, WITH LONG-TERM CURRENT USE OF INSULIN (HCC): ICD-10-CM

## 2025-07-09 NOTE — TELEPHONE ENCOUNTER
Requesting   Requested Prescriptions     Pending Prescriptions Disp Refills    PHENTERMINE HCL 37.5 MG Oral Tab [Pharmacy Med Name: PHENTERMINE 37.5 MG TABLET] 90 tablet 0     Sig: TAKE 1 TABLET BY MOUTH BEFORE BREAKFAST.       LOV: 4/11/2025  RTC: 11/10/2025  Last Relevant Labs: na  Filled: 11/13/2024 #90 tablet with 1 refills    Future Appointments   Date Time Provider Department Center   11/10/2025  2:20 PM Gladys Garces APRN [facility]

## 2025-07-10 RX ORDER — PHENTERMINE HYDROCHLORIDE 37.5 MG/1
37.5 TABLET ORAL
Qty: 90 TABLET | Refills: 0 | Status: SHIPPED | OUTPATIENT
Start: 2025-07-10

## 2025-08-22 RX ORDER — TIRZEPATIDE 12.5 MG/.5ML
12.5 INJECTION, SOLUTION SUBCUTANEOUS WEEKLY
Qty: 2 ML | Refills: 3 | OUTPATIENT
Start: 2025-08-22

## 2025-08-25 RX ORDER — TIRZEPATIDE 12.5 MG/.5ML
12.5 INJECTION, SOLUTION SUBCUTANEOUS WEEKLY
Qty: 2 ML | Refills: 3 | OUTPATIENT
Start: 2025-08-25

## (undated) NOTE — Clinical Note
Parag Hong has lost a total of #39 lbs so far! Completely off novolog!! Yeah!   Thanks,  Abdelrahman Lara

## (undated) NOTE — Clinical Note
FYI, TCM call made, see notes. NCM confirmed patient has a TCM HFU with the [facility] clinic on 5/5/2020 at 3:00 pm, via virtual phone visit.

## (undated) NOTE — LETTER
11/25/20        [phone]      Dear Kaylin [place],    [facility] records indicate that you have outstanding lab work and or testing that was ordered for you and has not yet been completed:  Orders Placed This En

## (undated) NOTE — LETTER
[address], [address] [facility], [address] [place] [address] [phone]            July 13, 2020      Peyman Marie  [address] [phone]      Dear Fabián

## (undated) NOTE — Clinical Note
Dr. Yael Brantley is down #60lbs total! Last a1c 5.2%.  He is doing amazing!!  Sincerely, JANICE Turpin APRN, [facility] [address] [facility]  [facility] [address], [address] [facility],

## (undated) NOTE — Clinical Note
Dr. Tiffany Nunes has lost a total of #46 lbs so far. Last a1c was 5.2% on 2/2021---> which was previously 11.6% 4/2020- completely stopped novolog, 20 units basaglar kwikpen in AM (was previously doing 40 units) and just jardance 25mg.   Fasting blood sug

## (undated) NOTE — LETTER
Delicious and Healthy Snacks      All snacks are under 200 calories and chocked full of nutrition! Quick Caprese salad- Mix 1 cup grape tomatoes, 1 oz. fresh mini mozzarella balls, 1 teaspoon olive oil, ½ tsp. balsamic vinegar.   Add fresh or dried basil f whole grain bread   Protein shake-Plaza®, Core Power®, Orgain®, Premier Protein®  Protein bar-Quest ®, Kesha Jennings

## (undated) NOTE — Clinical Note
Darien Tovar  I saw Mr. John Lr today, he hasn't had any medical care in the past year. We restarted phentermine and jardance and I put in orders for labs (FYI)  Sincerely, JANICE Hernandez APRN, [facility] [address] [facility]  [address] [address], [address] [place] [address], Anh, [address] [place] [address]. [facility]